# Patient Record
Sex: MALE | Race: WHITE | Employment: UNEMPLOYED | URBAN - METROPOLITAN AREA
[De-identification: names, ages, dates, MRNs, and addresses within clinical notes are randomized per-mention and may not be internally consistent; named-entity substitution may affect disease eponyms.]

---

## 2017-03-13 RX ORDER — SERTRALINE HYDROCHLORIDE 25 MG/1
25 TABLET, FILM COATED ORAL DAILY
Qty: 30 TABLET | Refills: 2 | Status: ON HOLD | OUTPATIENT
Start: 2017-03-13 | End: 2022-03-15 | Stop reason: HOSPADM

## 2017-04-13 ENCOUNTER — OFFICE VISIT (OUTPATIENT)
Dept: FAMILY MEDICINE CLINIC | Age: 22
End: 2017-04-13
Payer: COMMERCIAL

## 2017-04-13 VITALS
BODY MASS INDEX: 24.28 KG/M2 | HEART RATE: 69 BPM | WEIGHT: 179 LBS | OXYGEN SATURATION: 99 % | DIASTOLIC BLOOD PRESSURE: 70 MMHG | SYSTOLIC BLOOD PRESSURE: 120 MMHG

## 2017-04-13 DIAGNOSIS — R41.840 INATTENTION: Primary | ICD-10-CM

## 2017-04-13 DIAGNOSIS — F19.11 HX OF SUBSTANCE ABUSE (HCC): Chronic | ICD-10-CM

## 2017-04-13 PROCEDURE — 99213 OFFICE O/P EST LOW 20 MIN: CPT | Performed by: FAMILY MEDICINE

## 2017-05-21 ENCOUNTER — HOSPITAL ENCOUNTER (EMERGENCY)
Age: 22
Discharge: HOME | End: 2017-05-21
Attending: EMERGENCY MEDICINE
Payer: COMMERCIAL

## 2017-05-21 VITALS
TEMPERATURE: 97.5 F | RESPIRATION RATE: 16 BRPM | WEIGHT: 173 LBS | HEIGHT: 72 IN | HEART RATE: 74 BPM | BODY MASS INDEX: 23.43 KG/M2 | SYSTOLIC BLOOD PRESSURE: 132 MMHG | DIASTOLIC BLOOD PRESSURE: 70 MMHG | OXYGEN SATURATION: 100 %

## 2017-05-21 DIAGNOSIS — J02.9 VIRAL PHARYNGITIS: Primary | ICD-10-CM

## 2017-05-21 LAB
DIRECT EXAM: NORMAL
Lab: NORMAL
Lab: NORMAL
SPECIMEN DESCRIPTION: NORMAL
STATUS: NORMAL
STATUS: NORMAL

## 2017-05-21 PROCEDURE — 99283 EMERGENCY DEPT VISIT LOW MDM: CPT

## 2017-05-21 PROCEDURE — 87651 STREP A DNA AMP PROBE: CPT

## 2017-05-21 RX ORDER — IBUPROFEN 800 MG/1
800 TABLET ORAL EVERY 8 HOURS PRN
Qty: 20 TABLET | Refills: 0 | Status: SHIPPED | OUTPATIENT
Start: 2017-05-21

## 2017-05-21 RX ORDER — ACETAMINOPHEN AND CODEINE PHOSPHATE 300; 30 MG/1; MG/1
1 TABLET ORAL EVERY 6 HOURS PRN
Qty: 20 TABLET | Refills: 0 | Status: SHIPPED | OUTPATIENT
Start: 2017-05-21 | End: 2017-05-21

## 2017-05-21 ASSESSMENT — PAIN DESCRIPTION - DESCRIPTORS: DESCRIPTORS: ACHING;SHARP

## 2017-05-21 ASSESSMENT — ENCOUNTER SYMPTOMS
EYE REDNESS: 0
SHORTNESS OF BREATH: 0
COUGH: 0
SORE THROAT: 1
ABDOMINAL PAIN: 0
EYE DISCHARGE: 0
CONSTIPATION: 0
VOMITING: 0
DIARRHEA: 0
COLOR CHANGE: 0
FACIAL SWELLING: 0

## 2017-05-21 ASSESSMENT — PAIN DESCRIPTION - LOCATION: LOCATION: THROAT

## 2017-05-21 ASSESSMENT — PAIN DESCRIPTION - PAIN TYPE: TYPE: ACUTE PAIN

## 2017-05-21 ASSESSMENT — PAIN DESCRIPTION - FREQUENCY: FREQUENCY: CONTINUOUS

## 2017-05-21 ASSESSMENT — PAIN DESCRIPTION - PROGRESSION: CLINICAL_PROGRESSION: NOT CHANGED

## 2017-05-21 ASSESSMENT — PAIN SCALES - GENERAL: PAINLEVEL_OUTOF10: 9

## 2022-03-09 ENCOUNTER — HOSPITAL ENCOUNTER (INPATIENT)
Age: 27
LOS: 2 days | Discharge: PSYCHIATRIC OR SUBSTANCE USE FACILITY | DRG: 897 | End: 2022-03-12
Attending: STUDENT IN AN ORGANIZED HEALTH CARE EDUCATION/TRAINING PROGRAM | Admitting: INTERNAL MEDICINE

## 2022-03-09 DIAGNOSIS — F10.939 ALCOHOL WITHDRAWAL SYNDROME WITH COMPLICATION (HCC): Primary | ICD-10-CM

## 2022-03-09 LAB
ABSOLUTE EOS #: 0 K/UL (ref 0–0.4)
ABSOLUTE LYMPH #: 2.1 K/UL (ref 1–4.8)
ABSOLUTE MONO #: 0.4 K/UL (ref 0.1–1.3)
ALBUMIN SERPL-MCNC: 4.6 G/DL (ref 3.5–5.2)
ALP BLD-CCNC: 87 U/L (ref 40–129)
ALT SERPL-CCNC: 133 U/L (ref 5–41)
ANION GAP SERPL CALCULATED.3IONS-SCNC: 19 MMOL/L (ref 9–17)
AST SERPL-CCNC: 150 U/L
BASOPHILS # BLD: 1 % (ref 0–2)
BASOPHILS ABSOLUTE: 0 K/UL (ref 0–0.2)
BILIRUB SERPL-MCNC: 0.54 MG/DL (ref 0.3–1.2)
BUN BLDV-MCNC: 6 MG/DL (ref 6–20)
CALCIUM SERPL-MCNC: 8.9 MG/DL (ref 8.6–10.4)
CHLORIDE BLD-SCNC: 98 MMOL/L (ref 98–107)
CO2: 22 MMOL/L (ref 20–31)
CREAT SERPL-MCNC: 0.77 MG/DL (ref 0.7–1.2)
EOSINOPHILS RELATIVE PERCENT: 1 % (ref 0–4)
ETHANOL PERCENT: 0.3 %
ETHANOL: 302 MG/DL
GFR AFRICAN AMERICAN: >60 ML/MIN
GFR NON-AFRICAN AMERICAN: >60 ML/MIN
GFR SERPL CREATININE-BSD FRML MDRD: ABNORMAL ML/MIN/{1.73_M2}
GLUCOSE BLD-MCNC: 106 MG/DL (ref 70–99)
HCT VFR BLD CALC: 51.3 % (ref 41–53)
HEMOGLOBIN: 17.8 G/DL (ref 13.5–17.5)
LIPASE: 45 U/L (ref 13–60)
LYMPHOCYTES # BLD: 39 % (ref 24–44)
MCH RBC QN AUTO: 31.3 PG (ref 26–34)
MCHC RBC AUTO-ENTMCNC: 34.8 G/DL (ref 31–37)
MCV RBC AUTO: 90 FL (ref 80–100)
MONOCYTES # BLD: 7 % (ref 1–7)
PDW BLD-RTO: 14.4 % (ref 11.5–14.9)
PLATELET # BLD: 209 K/UL (ref 150–450)
PMV BLD AUTO: 7.6 FL (ref 6–12)
POTASSIUM SERPL-SCNC: 3.6 MMOL/L (ref 3.7–5.3)
RBC # BLD: 5.7 M/UL (ref 4.5–5.9)
SEG NEUTROPHILS: 52 % (ref 36–66)
SEGMENTED NEUTROPHILS ABSOLUTE COUNT: 2.8 K/UL (ref 1.3–9.1)
SODIUM BLD-SCNC: 139 MMOL/L (ref 135–144)
TOTAL PROTEIN: 7.8 G/DL (ref 6.4–8.3)
WBC # BLD: 5.3 K/UL (ref 3.5–11)

## 2022-03-09 PROCEDURE — 99284 EMERGENCY DEPT VISIT MOD MDM: CPT

## 2022-03-09 PROCEDURE — 36415 COLL VENOUS BLD VENIPUNCTURE: CPT

## 2022-03-09 PROCEDURE — 96376 TX/PRO/DX INJ SAME DRUG ADON: CPT

## 2022-03-09 PROCEDURE — 80053 COMPREHEN METABOLIC PANEL: CPT

## 2022-03-09 PROCEDURE — 6360000002 HC RX W HCPCS: Performed by: STUDENT IN AN ORGANIZED HEALTH CARE EDUCATION/TRAINING PROGRAM

## 2022-03-09 PROCEDURE — 83735 ASSAY OF MAGNESIUM: CPT

## 2022-03-09 PROCEDURE — 96374 THER/PROPH/DIAG INJ IV PUSH: CPT

## 2022-03-09 PROCEDURE — G0480 DRUG TEST DEF 1-7 CLASSES: HCPCS

## 2022-03-09 PROCEDURE — 85025 COMPLETE CBC W/AUTO DIFF WBC: CPT

## 2022-03-09 PROCEDURE — 83690 ASSAY OF LIPASE: CPT

## 2022-03-09 PROCEDURE — 2580000003 HC RX 258: Performed by: STUDENT IN AN ORGANIZED HEALTH CARE EDUCATION/TRAINING PROGRAM

## 2022-03-09 RX ORDER — LORAZEPAM 2 MG/ML
0.5 INJECTION INTRAMUSCULAR ONCE
Status: COMPLETED | OUTPATIENT
Start: 2022-03-09 | End: 2022-03-09

## 2022-03-09 RX ORDER — 0.9 % SODIUM CHLORIDE 0.9 %
1000 INTRAVENOUS SOLUTION INTRAVENOUS ONCE
Status: COMPLETED | OUTPATIENT
Start: 2022-03-10 | End: 2022-03-10

## 2022-03-09 RX ORDER — 0.9 % SODIUM CHLORIDE 0.9 %
1000 INTRAVENOUS SOLUTION INTRAVENOUS ONCE
Status: COMPLETED | OUTPATIENT
Start: 2022-03-09 | End: 2022-03-09

## 2022-03-09 RX ADMIN — LORAZEPAM 0.5 MG: 2 INJECTION INTRAMUSCULAR; INTRAVENOUS at 21:24

## 2022-03-09 RX ADMIN — SODIUM CHLORIDE 1000 ML: 9 INJECTION, SOLUTION INTRAVENOUS at 21:24

## 2022-03-09 ASSESSMENT — ENCOUNTER SYMPTOMS
RHINORRHEA: 0
VOMITING: 0
ABDOMINAL PAIN: 0
DIARRHEA: 0
BACK PAIN: 0
EYE REDNESS: 0
NAUSEA: 0
SHORTNESS OF BREATH: 0
EYE PAIN: 0
SORE THROAT: 0
COUGH: 0
FACIAL SWELLING: 0
COLOR CHANGE: 0

## 2022-03-10 PROBLEM — F10.139 ALCOHOL ABUSE WITH WITHDRAWAL (HCC): Status: ACTIVE | Noted: 2022-03-10

## 2022-03-10 LAB — MAGNESIUM: 2 MG/DL (ref 1.6–2.6)

## 2022-03-10 PROCEDURE — 6360000002 HC RX W HCPCS: Performed by: NURSE PRACTITIONER

## 2022-03-10 PROCEDURE — 2500000003 HC RX 250 WO HCPCS: Performed by: NURSE PRACTITIONER

## 2022-03-10 PROCEDURE — 99223 1ST HOSP IP/OBS HIGH 75: CPT | Performed by: INTERNAL MEDICINE

## 2022-03-10 PROCEDURE — 2060000000 HC ICU INTERMEDIATE R&B

## 2022-03-10 PROCEDURE — 6360000002 HC RX W HCPCS: Performed by: STUDENT IN AN ORGANIZED HEALTH CARE EDUCATION/TRAINING PROGRAM

## 2022-03-10 PROCEDURE — 2580000003 HC RX 258: Performed by: STUDENT IN AN ORGANIZED HEALTH CARE EDUCATION/TRAINING PROGRAM

## 2022-03-10 PROCEDURE — 90792 PSYCH DIAG EVAL W/MED SRVCS: CPT | Performed by: PSYCHIATRY & NEUROLOGY

## 2022-03-10 PROCEDURE — 6370000000 HC RX 637 (ALT 250 FOR IP): Performed by: NURSE PRACTITIONER

## 2022-03-10 PROCEDURE — 2580000003 HC RX 258: Performed by: NURSE PRACTITIONER

## 2022-03-10 PROCEDURE — 6370000000 HC RX 637 (ALT 250 FOR IP): Performed by: INTERNAL MEDICINE

## 2022-03-10 RX ORDER — SODIUM CHLORIDE 0.9 % (FLUSH) 0.9 %
5-40 SYRINGE (ML) INJECTION EVERY 12 HOURS SCHEDULED
Status: DISCONTINUED | OUTPATIENT
Start: 2022-03-10 | End: 2022-03-12 | Stop reason: HOSPADM

## 2022-03-10 RX ORDER — ONDANSETRON 2 MG/ML
4 INJECTION INTRAMUSCULAR; INTRAVENOUS EVERY 6 HOURS PRN
Status: DISCONTINUED | OUTPATIENT
Start: 2022-03-10 | End: 2022-03-12 | Stop reason: HOSPADM

## 2022-03-10 RX ORDER — ACETAMINOPHEN 325 MG/1
650 TABLET ORAL EVERY 6 HOURS PRN
Status: DISCONTINUED | OUTPATIENT
Start: 2022-03-10 | End: 2022-03-12 | Stop reason: HOSPADM

## 2022-03-10 RX ORDER — LORAZEPAM 2 MG/ML
0.5 INJECTION INTRAMUSCULAR ONCE
Status: COMPLETED | OUTPATIENT
Start: 2022-03-10 | End: 2022-03-10

## 2022-03-10 RX ORDER — OMEPRAZOLE 20 MG/1
40 CAPSULE, DELAYED RELEASE ORAL DAILY
COMMUNITY

## 2022-03-10 RX ORDER — ONDANSETRON 4 MG/1
4 TABLET, ORALLY DISINTEGRATING ORAL EVERY 8 HOURS PRN
Status: DISCONTINUED | OUTPATIENT
Start: 2022-03-10 | End: 2022-03-12 | Stop reason: HOSPADM

## 2022-03-10 RX ORDER — LORAZEPAM 1 MG/1
3 TABLET ORAL
Status: DISCONTINUED | OUTPATIENT
Start: 2022-03-10 | End: 2022-03-10

## 2022-03-10 RX ORDER — CHLORDIAZEPOXIDE HYDROCHLORIDE 25 MG/1
25 CAPSULE, GELATIN COATED ORAL 3 TIMES DAILY
Status: DISCONTINUED | OUTPATIENT
Start: 2022-03-10 | End: 2022-03-12

## 2022-03-10 RX ORDER — LORAZEPAM 1 MG/1
4 TABLET ORAL
Status: DISCONTINUED | OUTPATIENT
Start: 2022-03-10 | End: 2022-03-10

## 2022-03-10 RX ORDER — POTASSIUM CHLORIDE 7.45 MG/ML
10 INJECTION INTRAVENOUS PRN
Status: DISCONTINUED | OUTPATIENT
Start: 2022-03-10 | End: 2022-03-12 | Stop reason: HOSPADM

## 2022-03-10 RX ORDER — LORAZEPAM 2 MG/ML
1 INJECTION INTRAMUSCULAR
Status: DISCONTINUED | OUTPATIENT
Start: 2022-03-10 | End: 2022-03-10

## 2022-03-10 RX ORDER — LORAZEPAM 2 MG/ML
1 INJECTION INTRAMUSCULAR ONCE
Status: COMPLETED | OUTPATIENT
Start: 2022-03-10 | End: 2022-03-10

## 2022-03-10 RX ORDER — POLYETHYLENE GLYCOL 3350 17 G/17G
17 POWDER, FOR SOLUTION ORAL DAILY PRN
Status: DISCONTINUED | OUTPATIENT
Start: 2022-03-10 | End: 2022-03-12 | Stop reason: HOSPADM

## 2022-03-10 RX ORDER — PANTOPRAZOLE SODIUM 40 MG/1
40 TABLET, DELAYED RELEASE ORAL
Status: DISCONTINUED | OUTPATIENT
Start: 2022-03-10 | End: 2022-03-12 | Stop reason: HOSPADM

## 2022-03-10 RX ORDER — LORAZEPAM 2 MG/ML
2 INJECTION INTRAMUSCULAR
Status: DISCONTINUED | OUTPATIENT
Start: 2022-03-10 | End: 2022-03-10

## 2022-03-10 RX ORDER — POTASSIUM CHLORIDE 20 MEQ/1
40 TABLET, EXTENDED RELEASE ORAL PRN
Status: DISCONTINUED | OUTPATIENT
Start: 2022-03-10 | End: 2022-03-12 | Stop reason: HOSPADM

## 2022-03-10 RX ORDER — M-VIT,TX,IRON,MINS/CALC/FOLIC 27MG-0.4MG
1 TABLET ORAL DAILY
Status: DISCONTINUED | OUTPATIENT
Start: 2022-03-10 | End: 2022-03-12 | Stop reason: HOSPADM

## 2022-03-10 RX ORDER — LORAZEPAM 2 MG/ML
4 INJECTION INTRAMUSCULAR
Status: DISCONTINUED | OUTPATIENT
Start: 2022-03-10 | End: 2022-03-11

## 2022-03-10 RX ORDER — LORAZEPAM 2 MG/ML
3 INJECTION INTRAMUSCULAR
Status: DISCONTINUED | OUTPATIENT
Start: 2022-03-10 | End: 2022-03-10

## 2022-03-10 RX ORDER — LORAZEPAM 1 MG/1
1 TABLET ORAL
Status: DISCONTINUED | OUTPATIENT
Start: 2022-03-10 | End: 2022-03-10

## 2022-03-10 RX ORDER — SODIUM CHLORIDE 9 MG/ML
INJECTION, SOLUTION INTRAVENOUS CONTINUOUS
Status: DISCONTINUED | OUTPATIENT
Start: 2022-03-10 | End: 2022-03-12 | Stop reason: HOSPADM

## 2022-03-10 RX ORDER — SODIUM CHLORIDE 9 MG/ML
25 INJECTION, SOLUTION INTRAVENOUS PRN
Status: DISCONTINUED | OUTPATIENT
Start: 2022-03-10 | End: 2022-03-12 | Stop reason: HOSPADM

## 2022-03-10 RX ORDER — LORAZEPAM 1 MG/1
2 TABLET ORAL
Status: DISCONTINUED | OUTPATIENT
Start: 2022-03-10 | End: 2022-03-10

## 2022-03-10 RX ORDER — ACETAMINOPHEN 650 MG/1
650 SUPPOSITORY RECTAL EVERY 6 HOURS PRN
Status: DISCONTINUED | OUTPATIENT
Start: 2022-03-10 | End: 2022-03-12 | Stop reason: HOSPADM

## 2022-03-10 RX ORDER — SODIUM CHLORIDE 0.9 % (FLUSH) 0.9 %
10 SYRINGE (ML) INJECTION PRN
Status: DISCONTINUED | OUTPATIENT
Start: 2022-03-10 | End: 2022-03-12 | Stop reason: HOSPADM

## 2022-03-10 RX ADMIN — LORAZEPAM 1 MG: 1 TABLET ORAL at 09:51

## 2022-03-10 RX ADMIN — SODIUM CHLORIDE: 9 INJECTION, SOLUTION INTRAVENOUS at 07:56

## 2022-03-10 RX ADMIN — SODIUM CHLORIDE 1000 ML: 9 INJECTION, SOLUTION INTRAVENOUS at 00:09

## 2022-03-10 RX ADMIN — Medication 10 ML: at 09:00

## 2022-03-10 RX ADMIN — PANTOPRAZOLE SODIUM 40 MG: 40 TABLET, DELAYED RELEASE ORAL at 20:39

## 2022-03-10 RX ADMIN — CHLORDIAZEPOXIDE HYDROCHLORIDE 25 MG: 25 CAPSULE ORAL at 20:39

## 2022-03-10 RX ADMIN — CHLORDIAZEPOXIDE HYDROCHLORIDE 25 MG: 25 CAPSULE ORAL at 12:28

## 2022-03-10 RX ADMIN — LORAZEPAM 1 MG: 2 INJECTION INTRAMUSCULAR; INTRAVENOUS at 04:36

## 2022-03-10 RX ADMIN — LORAZEPAM 0.5 MG: 2 INJECTION INTRAMUSCULAR; INTRAVENOUS at 01:01

## 2022-03-10 RX ADMIN — LORAZEPAM 4 MG: 2 INJECTION INTRAMUSCULAR; INTRAVENOUS at 23:55

## 2022-03-10 RX ADMIN — SODIUM CHLORIDE: 9 INJECTION, SOLUTION INTRAVENOUS at 23:57

## 2022-03-10 RX ADMIN — SODIUM CHLORIDE: 9 INJECTION, SOLUTION INTRAVENOUS at 16:20

## 2022-03-10 RX ADMIN — ENOXAPARIN SODIUM 40 MG: 40 INJECTION SUBCUTANEOUS at 09:52

## 2022-03-10 RX ADMIN — MULTIPLE VITAMINS W/ MINERALS TAB 1 TABLET: TAB at 09:51

## 2022-03-10 RX ADMIN — FOLIC ACID: 5 INJECTION, SOLUTION INTRAMUSCULAR; INTRAVENOUS; SUBCUTANEOUS at 12:21

## 2022-03-10 NOTE — ED NOTES
Report given to Charmayne Burrows, RN from [facility]. Report method by phone   The following was reviewed with receiving RN:   Current vital signs:  /70   Pulse 90   Temp 97.7 °F (36.5 °C)   Resp 16   Ht 6' (1.829 m)   SpO2 96%   BMI 23.46 kg/m²                MEWS Score: 2     Any medication or safety alerts were reviewed. Any pending diagnostics and notifications were also reviewed, as well as any safety concerns or issues, abnormal labs, abnormal imaging, and abnormal assessment findings. Questions were answered.             Yimi Dey RN  03/10/22 0563

## 2022-03-10 NOTE — FLOWSHEET NOTE
03/10/22 0940   Encounter Summary   [facility] Completed   Routine   Intervention Contacted support as requested per patient/family request   Who? Omar Brown   Why?  Spiritual Support   At Request Of Patient

## 2022-03-10 NOTE — PROGRESS NOTES
Patient resting with eyes closed arouses easy sitter at bedside , earlier patient complained of weird dreams sitter noted jerking while sleep will continue to monitor no acute signs of withdrawal at this time.

## 2022-03-10 NOTE — CARE COORDINATION
CASE MANAGEMENT NOTE:    Admission Date:  3/9/2022 Preeti Delacruz is a 32 y.o.  male    Admitted for : Alcohol withdrawal syndrome with complication ([facility] [T19.365]  [facility] withdrawal ([facility] [F10.139]    Met with:  Patient    PCP:  None, provided information for [facility]:  none      Is patient alert and oriented at time of discussion:  Yes    Current Residence/ Living Arrangements:  independently at home             Current Services PTA:  No    Does patient go to outpatient dialysis: No  If yes, location and chair time:     Is patient agreeable to VNS: No    Freedom of choice provided:  NA    List of [address] provided: NA    VNS chosen:  NA    DME:  none    Home Oxygen: No    Nebulizer: No    CPAP/BIPAP: No    Supplier: N/A    Potential Assistance Needed: No    SNF needed: No    Freedom of choice and list provided: NA    Ph[facility]acy:  [facility]       Does Patient want to use MEDS to BEDS? No    Is patient currently receiving oral anticoagulation therapy? No    Is the Patient an LETICIA GEE [facility] with Readmission Risk Score greater than 14%? No  If yes, pt needs a follow up appointment made within 7 days. Family Members/Caregivers that pt would like involved in their care:    No    If yes, list name here:      Transportation Provider:  Patient             Discharge Plan:  Patient from home with dad. Independent and drives. Provided information for [facility]. Denies need for VNS. DME:none. Requesting information on Drug and alcohol rehab.                   Electronically signed by: Jimmie Banks RN on 3/10/2022 at 10:20 AM

## 2022-03-10 NOTE — CARE COORDINATION
Department of Psychiatry  Attending Physician Psychiatric Consult    Care Coordination note only - not to be used for billing    Patient was seen and examined in person, chart reviewed, case discussed with staff/team     Reason for consult:   Concerns for suicidal ideation     HISTORY OF PRESENT ILLNESS:   Patient is a 60-year-old male with a history of depression and anxiety. Patient also endorses a significant history of alcohol use disorder. Patient was seen at bedside for evaluation with a sitter present. Patient presented to be uncomfortable and in acute distress related to reported alcohol withdrawal symptoms. Patient states that he experienced auditory hallucinations of his cell phone ringing when it was not earlier today. He relates this to withdrawal symptoms. Denies history of any additional auditory hallucinations. Patient states he has not started severe withdrawal symptoms yet, and is worried about returning to the community and getting sick. Patient was visibly lethargic throughout interview with eyes shut most of the time. Prior to admission patient reports drinking 15-20 shots of liquor per day for last few days related to recent break-up. Patient reports having suicidal thoughts a few days ago, for 1 day. Patient states that he had no plan or intention to harm himself and the thoughts were passive and went away. Patient denies drinking with intention to harm himself. Patient states alcohol consumption was related to desire to numb his pain and denies any thoughts or desire to harm himself or end his life. Patient does endorse significant depression at this time as an 8 out of 10 on a 1-10 scale (1 being low and 10 being high). Patient reports she has been dealing with depression since high school and relates current depression to job loss and loss of apartment.   Patient states recently his sleep has been affected, has had a reduction of interest, reduction in energy and reduction in concentration. Patient reports his appetite is \"on and off\". Patient reports struggling with feelings of guilt and worthlessness. Patient states he had suicidal ideations 1 time in high school with plan to overdose however never actually attempted to harm himself. Patient reported multiple times if discharged, he would be able to maintain safety. Patient reports his main concern is sobriety and states he would benefit from further hospitalization to prevent him from drinking. Patient was encouraged to follow-up with outpatient therapy and alcohol treatment and patient reports desire to do this. When discussing support in the community patient endorses having significant support from friends and family. Patient reports coping skills of cooking, reading and art if desire to drink returns. Patient also endorses someone from Medicaid talked with him about setting up outpatient care for mental health treatment. He reports desire to follow through with therapy and medication management of depression. Patient may benefit from treatment with antidepressant. Patient reports that he would not benefit from inpatient hospitalization related to safety. It is this author's assessment that patient will be able to effectively address mental health symptoms outpatient and is not a safety risk to himself at time of interview. When discussing alcohol consumption patient reports that he has been drinking significantly since 21years old. Patient endorses a period of sobriety for 3 months about a year ago. Patient reports drinking 15-20 shots per day for the last few days. Patient states the last time he drank was 5:30 PM prior to admission. Blood alcohol level was 0.302 upon admitting labs. When discussing illicit drug use patient reports marijuana consumption 5 days of the week. Patient endorses a history of experimenting with cocaine.     PSYCHIATRIC HISTORY: Endorses  Currently follows with no outpatient provider. Reports previously following [facility]. Patient states issues with insurance provided further appointments. Lifetime suicide attempts: No previous attempts. 1 previous occurrence of suicidal ideation in high school. Previous psychiatric hospital admissions: Reports 2 inpatient psychiatric hospitalizations at [facility]. Past psychiatric medications includes: Seroquel and Wellbutrin    Adverse reactions from psychotropic medications: Denies    Lifetime Psychiatric Review of Systems       Depression: Endorses     Nirali or Hypomania: Denies     Panic Attacks: Endorses     Phobias: Denies     Obsessions and Compulsions: Denies      Body or Vocal Tics: Denies     Hallucinations: Denies     Delusions: Denies    Past Medical History:        Diagnosis Date    Moderate episode of recurrent major depressive disorder ([facility] 12/8/2016       Past Surgical History:        Procedure Laterality Date    TYMPANOSTOMY TUBE PLACEMENT Bilateral 1996    WRIST FRACTURE SURGERY Right 2011       Allergies:  Grass pollen(k-o-r-t-swt ashutosh)    Social History:     Patient was born twin [address] [place] [address]. Raised by his parents who split up when he was 10years old. Patient reports being raised at the time by his mother and half-time by his father. Patient reports currently being close to both. Patient states he has 1 sister, 1 stepsister and one half brother. Patient reports not graduating from high school. Patient currently works at [employer]. Patient denies ever being  and reports having no kids. Patient states he is living with his dad for the time being after recently losing his apartment. Patient reports assets as significant support in the community through friends and family.       DRUG USE HISTORY  Social History     Tobacco Use   Smoking Status Current Every Day Smoker    Packs/day: 0.50    Types: E-Cigarettes   Smokeless Tobacco Never Used     Social History     Substance and Sexual Activity   Alcohol Use Yes    Alcohol/week: 15.0 standard drinks    Types: 15 Shots of liquor per week    Comment: 15 shots of whiskey/vodka a day     Social History     Substance and Sexual Activity   Drug Use Yes    Types: Marijuana (Weed)    Comment: daily     When discussing alcohol consumption patient reports that he has been drinking significantly since 21years old. Patient endorses a period of sobriety for 3 months about a year ago. Patient reports drinking 15-20 shots per day for the last few days. Patient states the last time he drank was 5:30 PM prior to admission. Blood alcohol level was 0.302 upon admitting labs. When discussing illicit drug use patient reports marijuana consumption 5 days of the week. Patient endorses a history of experimenting with cocaine. LEGAL HISTORY:   HISTORY OF INCARCERATION: Denies     Family History:       Problem Relation Age of Onset    Depression Mother     Depression Sister     Depression Maternal Aunt     Heart Attack Neg Hx     Stroke Neg Hx     Cancer Neg Hx     Diabetes Neg Hx        Psychiatric Family History  Dx: Reports sister has ADHD and depression. Reports denies anxiety. Reports mother has something but is unsure about diagnosis. Suicides in family: Reports his aunt committed suicide by hanging. Substance use in family: Endorses numerous family members are alcoholics. PHYSICAL EXAM:  Vitals:  BP (!) 150/103   Pulse 95   Temp 98.4 °F (36.9 °C) (Axillary)   Resp 20   Ht 6' (1.829 m)   Wt 229 lb 4.5 oz (104 kg)   SpO2 96%   BMI 31.10 kg/m²     Review of Systems   Constitutional: Negative for chills and weight loss. HENT: Negative for ear pain and nosebleeds. Eyes: Negative for blurred vision and photophobia. Respiratory: Negative for cough, shortness of breath and wheezing. Cardiovascular: Negative for chest pain and palpitations. Gastrointestinal: Negative for abdominal pain, diarrhea and vomiting. Genitourinary: Negative for dysuria and urgency. Musculoskeletal: Negative for falls and joint pain. Skin: Negative for itching and rash. Neurological: Negative for tremors, seizures and weakness. Endo/Heme/Allergies: Does not bruise/bleed easily. Physical Exam:    Constitutional:  Appears well-developed and well-nourished, no acute distress  HENT: No sinus or nasal congestion, no ear pain or hearing loss   Head: Normocephalic and atraumatic. Eyes: PERRLA, Conjunctivae are normal. Right eye exhibits no discharge. Left eye exhibits no discharge. No scleral icterus. Neck: Normal range of motion. Neck supple. Pulmonary/Chest:  No respiratory distress or accessory muscle use, no wheezing. Abdominal: Soft. Exhibits no distension. Musculoskeletal: Normal range of motion. Exhibits no edema. Neurological: cranial nerves II-XII grossly in tact, normal gait and station  Skin: Skin is warm and dry. Patient is not diaphoretic. No erythema. Mental Status Examination:    Level of consciousness:   Lethargic  Appearance:  Casually dressed, good grooming and hygiene  Behavior/Motor:  Tired, closing eyes for most of interview  Attitude toward examiner:  Cooperative  Speech: normal rate and volume  Mood:  depressed  Affect:  Congruent  Thought processes:  Goal-Directed, linear, coherent  Thought content:  Suicidal ideations: Denies         Homicidal ideations: Denies             Hallucinations: Endorses auditory hallucinations of cell phone ringing   Delusions: Denies  Cognition:  Oriented to self, location, time, situation  Concentration within normal limits  Memory: recent and remote memory intact  Insight &Judgment: Fair     ASSESSMENT:   Major depressive disorder, recurrent, severe, without suicidal ideation. PLAN:    Can discharge home after he is medically cleared. Consulted attending psychiatrist: Will consider starting antidepressant while in hospital.        Thank you very much for allowing us to participate in the care of this patient.

## 2022-03-10 NOTE — H&P
History and Physical Service  [facility] Internal Medicine    HISTORY AND PHYSICAL EXAMINATION            Date:   3/10/2022  Patient name:  Matthew Figueroa  MRN:   [id number]  Account:  [de-identified]  YOB: 1995  PCP:    No primary care provider on file. Code Status:    Full Code    Chief Complaint:     Chief Complaint   Patient presents with    Withdrawal     alcohol    Depression         History Obtained From:     Patient, EMR, nursing staff  His  HPI   tory Obtained From:  Past MediPast Medical History:edison History: This patient is a 32 y.o. Non- / non  malewho presents with  59-year-old  male past medical history of substance abuse and recurrent major depressive disorder who presents to the ED for alcohol withdrawal and depression. According to patient, he has been drinking 15-20 shots of liquor every day for the past several days  History of heavy drinking for last several years  Reports auditory hallucinations previously when undergoing withdrawal  Requesting help with withdrawal  Patient was anxious tremulous restless at presentation  Voiced suicidal ideation on admission-psychiatry consulted      Review of Systems:     Denies any shortness of breath or cough  Denies chest pain or palpitations  Denies abdominal pain, diarrhea vomiting  Denies any new numbness tremors or weakness. A 10 point review of systems was performed and and negative except as mentioned in HPI  Positive and Negative as described in HPI. Past Medical History:     Past Medical History:   Diagnosis Date    Moderate episode of recurrent major depressive disorder ([facility] 12/8/2016        Past Surgical History:     Past Surgical History:   Procedure Laterality Date    TYMPANOSTOMY TUBE PLACEMENT Bilateral 1996    WRIST FRACTURE SURGERY Right 2011        Medications Prior to Admission:     Prior to Admission medications    Medication Sig Start Date End Date Taking?  Authorizing Provider omeprazole (PRILOSEC) 20 MG delayed release capsule Take 40 mg by mouth Daily   Yes Historical Provider, MD   ibuprofen (ADVIL;MOTRIN) 800 MG tablet Take 1 tablet by mouth every 8 hours as needed for Pain 5/21/17   Yun Huber MD   sertraline (ZOLOFT) 25 MG tablet Take 1 tablet by mouth daily 3/13/17   Flynn Campos MD        Allergies:     Grass pollen(k-o-r-t-swt ashutosh)    Social History:     Tobacco:    reports that he has been smoking e-cigarettes. He has been smoking about 0.50 packs per day. He has never used smokeless tobacco.  Alcohol:      reports current alcohol use of about 15.0 standard drinks of alcohol per week. Drug Use:  reports current drug use. Drug: Marijuana Leonides Brim). Family History:     Family History   Problem Relation Age of Onset    Depression Mother     Depression Sister     Depression Maternal Aunt     Heart Attack Neg Hx     Stroke Neg Hx     Cancer Neg Hx     Diabetes Neg Hx            Physical Exam:   BP (!) 140/94   Pulse 98   Temp 98 °F (36.7 °C) (Oral)   Resp 20   Ht 6' (1.829 m)   Wt 229 lb 4.5 oz (104 kg)   SpO2 96%   BMI 31.10 kg/m²   No results for input(s): POCGLU in the last 72 hours. General Appearance:  alert, well appearing, and in no acute distress  Mental status: oriented to person, place, and time with normal affect  Head:  normocephalic, atraumatic. Eye: no icterus, redness, pupils equal and reactive, extraocular eye movements intact, conjunctiva clear  Ear: normal external ear, no discharge, hearing intact  Nose:  no drainage noted  Mouth: mucous membranes moist  Neck: supple, no carotid bruits, thyroid not palpable  Lungs: Bilateral equal air entry, clear to ausculation, no wheezing, rales or rhonchi, normal effort  Cardiovascular: normal rate, regular rhythm, no murmur, gallop, rub.   Abdomen: Soft, nontender, nondistended, normal bowel sounds, no hepatomegaly or splenomegaly  Neurologic: There are no new focal motor or sensory deficits, normal muscle tone and bulk, no abnormal sensation, normal speech, cranial nerves II through XII grossly intact  Skin: No gross lesions, rashes, bruising or bleeding on exposed skin area  Extremities:  peripheral pulses palpable, no pedal edema or calf pain with palpation  Psych: normal affect     Investigations:      Laboratory Testing:  Recent Results (from the past 24 hour(s))   CBC with Auto Differential    Collection Time: 03/09/22  9:20 PM   Result Value Ref Range    WBC 5.3 3.5 - 11.0 k/uL    RBC 5.70 4.5 - 5.9 m/uL    Hemoglobin 17.8 (H) 13.5 - 17.5 g/dL    Hematocrit 51.3 41 - 53 %    MCV 90.0 80 - 100 fL    MCH 31.3 26 - 34 pg    MCHC 34.8 31 - 37 g/dL    RDW 14.4 11.5 - 14.9 %    Platelets 466 206 - 011 k/uL    MPV 7.6 6.0 - 12.0 fL    Seg Neutrophils 52 36 - 66 %    Lymphocytes 39 24 - 44 %    Monocytes 7 1 - 7 %    Eosinophils % 1 0 - 4 %    Basophils 1 0 - 2 %    Segs Absolute 2.80 1.3 - 9.1 k/uL    Absolute Lymph # 2.10 1.0 - 4.8 k/uL    Absolute Mono # 0.40 0.1 - 1.3 k/uL    Absolute Eos # 0.00 0.0 - 0.4 k/uL    Basophils Absolute 0.00 0.0 - 0.2 k/uL   Comprehensive Metabolic Panel w/ Reflex to MG    Collection Time: 03/09/22  9:20 PM   Result Value Ref Range    Glucose 106 (H) 70 - 99 mg/dL    BUN 6 6 - 20 mg/dL    CREATININE 0.77 0.70 - 1.20 mg/dL    Calcium 8.9 8.6 - 10.4 mg/dL    Sodium 139 135 - 144 mmol/L    Potassium 3.6 (L) 3.7 - 5.3 mmol/L    Chloride 98 98 - 107 mmol/L    CO2 22 20 - 31 mmol/L    Anion Gap 19 (H) 9 - 17 mmol/L    Alkaline Phosphatase 87 40 - 129 U/L     (H) 5 - 41 U/L     (H) <40 U/L    Total Bilirubin 0.54 0.3 - 1.2 mg/dL    Total Protein 7.8 6.4 - 8.3 g/dL    Albumin 4.6 3.5 - 5.2 g/dL    GFR Non-African American >60 >60 mL/min    GFR African American >60 >60 mL/min    GFR Comment         Lipase    Collection Time: 03/09/22  9:20 PM   Result Value Ref Range    Lipase 45 13 - 60 U/L   Ethanol    Collection Time: 03/09/22  9:20 PM   Result Value Ref Range Ethanol 302 (HH) <10 mg/dL    Ethanol percent 0.302 %       Recent Labs     03/09/22 2120   HGB 17.8*   HCT 51.3   WBC 5.3   MCV 90.0      K 3.6*   CL 98   CO2 22   BUN 6   CREATININE 0.77   GLUCOSE 106*   *   *   LABALBU 4.6       Hematology:  Recent Labs     03/09/22 2120   WBC 5.3   RBC 5.70   HGB 17.8*   HCT 51.3   MCV 90.0   MCH 31.3   MCHC 34.8   RDW 14.4      MPV 7.6     Chemistry:  Recent Labs     03/09/22 2120      K 3.6*   CL 98   CO2 22   GLUCOSE 106*   BUN 6   CREATININE 0.77   ANIONGAP 19*   LABGLOM >60   GFRAA >60   CALCIUM 8.9     Recent Labs     03/09/22 2120   PROT 7.8   LABALBU 4.6   *   *   ALKPHOS 87   BILITOT 0.54   LIPASE 45       Imaging/Diagnostics:       No results found.      Current Facility-Administered Medications   Medication Dose Route Frequency Provider Last Rate Last Admin    0.9 % sodium chloride infusion   IntraVENous Continuous SUSAN Storm  mL/hr at 03/10/22 0756 New Bag at 03/10/22 0756    sodium chloride flush 0.9 % injection 5-40 mL  5-40 mL IntraVENous 2 times per day SUSAN Storm CNP   10 mL at 03/10/22 0900    sodium chloride flush 0.9 % injection 10 mL  10 mL IntraVENous PRN SUSAN Storm CNP        0.9 % sodium chloride infusion  25 mL IntraVENous PRN SUSAN Storm CNP        potassium chloride (KLOR-CON M) extended release tablet 40 mEq  40 mEq Oral PRN SUSAN Storm CNP        Or    potassium bicarb-citric acid (EFFER-K) effervescent tablet 40 mEq  40 mEq Oral PRN SUSAN Storm CNP        Or    potassium chloride 10 mEq/100 mL IVPB (Peripheral Line)  10 mEq IntraVENous PRN SUSAN Storm CNP        enoxaparin (LOVENOX) injection 40 mg  40 mg SubCUTAneous Daily SUSAN Storm CNP   40 mg at 03/10/22 3289    ondansetron (ZOFRAN-ODT) disintegrating tablet 4 mg  4 mg Oral Q8H PRN SUSAN Storm - CNP        Or    ondansetron [facility]) injection 4 mg 4 mg IntraVENous Q6H PRN Modesta Daubs, APRN - CNP        polyethylene glycol [facility]) packet 17 g  17 g Oral Daily PRN Modesta Daubs, APRN - CNP        acetaminophen (TYLENOL) tablet 650 mg  650 mg Oral Q6H PRN Modesta Daubs, APRN - CNP        Or    acetaminophen (TYLENOL) suppository 650 mg  650 mg Rectal Q6H PRN Modesta Daubs, APRN - CNP        therapeutic multivitamin-minerals 1 tablet  1 tablet Oral Daily Modesta Daubs, APRN - CNP   1 tablet at 18/83/57 3187    folic acid 1 mg, thiamine (B-1) 100 mg in dextrose 5 % 50 mL IVPB   IntraVENous Daily Modesta Daubs, APRN - CNP        LORazepam (ATIVAN) tablet 1 mg  1 mg Oral Q1H PRN Modesta Daubs, APRN - CNP   1 mg at 03/10/22 7587    Or    LORazepam (ATIVAN) injection 1 mg  1 mg IntraVENous Q1H PRN Modesta Daubs, APRN - CNP        Or    LORazepam (ATIVAN) tablet 2 mg  2 mg Oral Q1H PRN Modesta Daubs, APRN - CNP        Or    LORazepam (ATIVAN) injection 2 mg  2 mg IntraVENous Q1H PRN Modesta Daubs, APRN - CNP        Or    LORazepam (ATIVAN) tablet 3 mg  3 mg Oral Q1H PRN Modesta Daubs, APRN - CNP        Or    LORazepam (ATIVAN) injection 3 mg  3 mg IntraVENous Q1H PRN Modesta Daubs, APRN - CNP        Or    LORazepam (ATIVAN) tablet 4 mg  4 mg Oral Q1H PRN Modesta Daubs, APRN - CNP        Or    LORazepam (ATIVAN) injection 4 mg  4 mg IntraVENous Q1H PRN Modesta Daubs, APRN - CNP        nicotine polacrilex (NICORETTE) gum 4 mg  4 mg Oral Q1H PRN Modesta Daubs, APRN - CNP        pantoprazole (PROTONIX) tablet 40 mg  40 mg Oral QAM AC Modesta Daubs, APRN - CNP           Impressions :     1. Principal Problem:    Alcohol abuse with withdrawal ([facility]  Resolved Problems:    * No resolved hospital problems. *        2.  has a past medical history of Moderate episode of recurrent major depressive disorder ([facility] (12/8/2016). Plans:     1. Alcohol intoxication, dependence -admitted for management of withdrawal symptoms.   Has received 2.5mg of Ativan in the last 12 hours last dose 2 hours ago. Discontinue CIWA, start Librium  2.  Elevated transaminases secondary to alcohol abuse   3. suicidal thoughts-psych consult    DVT pplx-Lovenox      Yessica Dimas MD  3/10/2022  11:15 AM

## 2022-03-10 NOTE — PROGRESS NOTES
The patient is a 49-year-old  male past medical history of substance abuse and recurrent major depressive disorder who presents to the ED for alcohol withdrawal and depression. According to patient, he has been drinking 15-20 shots of liquor every day for the past several days, since breaking up with his girlfriend a couple weeks ago. Prior to that, patient reports heavy drinking for the past 6 months and states that he has been drinking alcohol since the age of 24. Reports past episodes of alcohol withdrawal without seizures but does admit to intermittent auditory hallucinations in the past, such as thinking he hears the telephone ringing. Additionally, patient reports having suicidal ideation a few days ago without specific plan mentioned. Patient reports that he is \"done drinking\" and wants help with the withdrawal process.  on arrival with last drink at 5 PM this evening. Patient is anxious and tremulous, restless and having difficulty sleeping. Alcohol withdrawal orders initiated, including CIWA scale. Will place on suicide precautions with sitter at bedside until evaluated by psychiatrist later today.   Nicotine gum initiated for nicotine withdrawal.

## 2022-03-10 NOTE — ED PROVIDER NOTES
No swelling. Normal range of motion. Cervical back: Normal range of motion. No rigidity. Skin:     General: Skin is warm and dry. Neurological:      General: No focal deficit present. Mental Status: He is alert. Mental status is at baseline. Cranial Nerves: No cranial nerve deficit. Psychiatric:      Comments: Anxious          MEDICAL DECISION MAKINyear old male presents for evaluation of alcohol withdrawals. Patient is tachycardic and anxious appearing. We will check basic labs, treat symptomatically, reassess. Currently denying suicidal ideations. Patient persistently tachycardic and anxious after several liters of IV fluid, several doses of benzodiazepine. His alcohol level was significantly elevated in the 300s. With ongoing symptoms will admit for impending alcohol withdrawal for close observation. CRITICAL CARE:       PROCEDURES:    Procedures    DIAGNOSTIC RESULTS   EKG:All EKG's are interpreted by the Emergency Department Physician who either signs or Co-signs this chart in the absence of a cardiologist.        RADIOLOGY:All plain film, CT, MRI, and formal ultrasound images (except ED bedside ultrasound) are read by the radiologist, see reports below, unless otherwisenoted in MDM or here. No orders to display     LABS: All lab results were reviewed by myself, and all abnormals are listed below.   Labs Reviewed   CBC WITH AUTO DIFFERENTIAL - Abnormal; Notable for the following components:       Result Value    Hemoglobin 17.8 (*)     All other components within normal limits   COMPREHENSIVE METABOLIC PANEL W/ REFLEX TO MG FOR LOW K - Abnormal; Notable for the following components:    Glucose 106 (*)     Potassium 3.6 (*)     Anion Gap 19 (*)      (*)      (*)     All other components within normal limits   ETHANOL - Abnormal; Notable for the following components:    Ethanol 302 (*)     All other components within normal limits   LIPASE       EMERGENCY DEPARTMENTCOURSE:         Vitals:    Vitals:    03/10/22 0030 03/10/22 0103 03/10/22 0115 03/10/22 0215   BP: 126/75 128/75 131/76 115/69   Pulse: 105 112 114 104   Resp: 16  18 12   Temp:       SpO2: 98%  96% 94%   Height:           The patient was given the following medications while in the emergency department:  Orders Placed This Encounter   Medications    0.9 % sodium chloride IV bolus 1,000 mL    LORazepam (ATIVAN) injection 0.5 mg    0.9 % sodium chloride IV bolus 1,000 mL    LORazepam (ATIVAN) injection 0.5 mg     CONSULTS:  None    FINAL IMPRESSION      1. Alcohol withdrawal syndrome with complication [facility])          DISPOSITION/PLAN   DISPOSITION Decision To Admit 03/10/2022 12:49:35 AM      PATIENT REFERRED TO:  No follow-up provider specified. DISCHARGE MEDICATIONS:  New Prescriptions    No medications on file     The care is provided during an unprecedented national emergency due to the novel coronavirus, COVID 19.   MD Clare Martin MD  03/10/22 1822

## 2022-03-11 LAB
SARS-COV-2, RAPID: NOT DETECTED
SPECIMEN DESCRIPTION: NORMAL

## 2022-03-11 PROCEDURE — 6370000000 HC RX 637 (ALT 250 FOR IP): Performed by: INTERNAL MEDICINE

## 2022-03-11 PROCEDURE — 2580000003 HC RX 258: Performed by: NURSE PRACTITIONER

## 2022-03-11 PROCEDURE — 87635 SARS-COV-2 COVID-19 AMP PRB: CPT

## 2022-03-11 PROCEDURE — 6370000000 HC RX 637 (ALT 250 FOR IP): Performed by: NURSE PRACTITIONER

## 2022-03-11 PROCEDURE — 6360000002 HC RX W HCPCS: Performed by: NURSE PRACTITIONER

## 2022-03-11 PROCEDURE — 99232 SBSQ HOSP IP/OBS MODERATE 35: CPT | Performed by: INTERNAL MEDICINE

## 2022-03-11 PROCEDURE — 2060000000 HC ICU INTERMEDIATE R&B

## 2022-03-11 PROCEDURE — 2500000003 HC RX 250 WO HCPCS: Performed by: NURSE PRACTITIONER

## 2022-03-11 RX ORDER — M-VIT,TX,IRON,MINS/CALC/FOLIC 27MG-0.4MG
1 TABLET ORAL DAILY
Qty: 100 TABLET | Refills: 0 | Status: SHIPPED | OUTPATIENT
Start: 2022-03-11

## 2022-03-11 RX ORDER — M-VIT,TX,IRON,MINS/CALC/FOLIC 27MG-0.4MG
1 TABLET ORAL DAILY
Status: CANCELLED | OUTPATIENT
Start: 2022-03-11

## 2022-03-11 RX ORDER — CLONIDINE HYDROCHLORIDE 0.1 MG/1
0.1 TABLET ORAL 2 TIMES DAILY PRN
Status: CANCELLED | OUTPATIENT
Start: 2022-03-11 | End: 2022-03-14

## 2022-03-11 RX ORDER — PANTOPRAZOLE SODIUM 40 MG/1
40 TABLET, DELAYED RELEASE ORAL
Status: CANCELLED | OUTPATIENT
Start: 2022-03-12

## 2022-03-11 RX ORDER — CLONIDINE HYDROCHLORIDE 0.1 MG/1
0.1 TABLET ORAL 2 TIMES DAILY
Status: DISCONTINUED | OUTPATIENT
Start: 2022-03-11 | End: 2022-03-12 | Stop reason: HOSPADM

## 2022-03-11 RX ORDER — CARVEDILOL 3.12 MG/1
3.12 TABLET ORAL 2 TIMES DAILY WITH MEALS
Status: DISCONTINUED | OUTPATIENT
Start: 2022-03-11 | End: 2022-03-12 | Stop reason: HOSPADM

## 2022-03-11 RX ORDER — CARVEDILOL 3.12 MG/1
3.12 TABLET ORAL 2 TIMES DAILY WITH MEALS
Status: CANCELLED | OUTPATIENT
Start: 2022-03-11

## 2022-03-11 RX ADMIN — MULTIPLE VITAMINS W/ MINERALS TAB 1 TABLET: TAB at 10:17

## 2022-03-11 RX ADMIN — SODIUM CHLORIDE: 9 INJECTION, SOLUTION INTRAVENOUS at 08:41

## 2022-03-11 RX ADMIN — CHLORDIAZEPOXIDE HYDROCHLORIDE 25 MG: 25 CAPSULE ORAL at 10:17

## 2022-03-11 RX ADMIN — ACETAMINOPHEN 650 MG: 325 TABLET, FILM COATED ORAL at 20:14

## 2022-03-11 RX ADMIN — CLONIDINE HYDROCHLORIDE 0.1 MG: 0.1 TABLET ORAL at 20:12

## 2022-03-11 RX ADMIN — ACETAMINOPHEN 650 MG: 325 TABLET, FILM COATED ORAL at 10:18

## 2022-03-11 RX ADMIN — CARVEDILOL 3.12 MG: 3.12 TABLET, FILM COATED ORAL at 10:17

## 2022-03-11 RX ADMIN — ENOXAPARIN SODIUM 40 MG: 40 INJECTION SUBCUTANEOUS at 10:18

## 2022-03-11 RX ADMIN — FOLIC ACID: 5 INJECTION, SOLUTION INTRAMUSCULAR; INTRAVENOUS; SUBCUTANEOUS at 10:26

## 2022-03-11 RX ADMIN — CHLORDIAZEPOXIDE HYDROCHLORIDE 25 MG: 25 CAPSULE ORAL at 20:12

## 2022-03-11 RX ADMIN — CLONIDINE HYDROCHLORIDE 0.1 MG: 0.1 TABLET ORAL at 13:19

## 2022-03-11 RX ADMIN — CHLORDIAZEPOXIDE HYDROCHLORIDE 25 MG: 25 CAPSULE ORAL at 13:19

## 2022-03-11 RX ADMIN — CARVEDILOL 3.12 MG: 3.12 TABLET, FILM COATED ORAL at 17:11

## 2022-03-11 RX ADMIN — ONDANSETRON 4 MG: 4 TABLET, ORALLY DISINTEGRATING ORAL at 20:16

## 2022-03-11 ASSESSMENT — PAIN SCALES - GENERAL
PAINLEVEL_OUTOF10: 5
PAINLEVEL_OUTOF10: 0
PAINLEVEL_OUTOF10: 2

## 2022-03-11 NOTE — PROGRESS NOTES
Spoke with [facility] intake regarding patient , patient is wanting to be admitted voluntarily family brought some personal belongings cell phone  and clothing for his inpatient stay patient will be admitted to room 225 bed 2 sometime today.

## 2022-03-11 NOTE — CONSULTS
Department of Psychiatry  Attending Physician Psychiatric Consult       Patient was seen and examined in person, chart reviewed, case discussed with staff/team     Reason for consult:   Concerns for suicidal ideation     HISTORY OF PRESENT ILLNESS:     Patient is a 70-year-old male with extensive history of depression and anxiety admitted for alcohol withdrawals. Psychiatrist consulted for suicidal ideation. Patient reports that he has been abusing 15-20 shots of liquor every day and has been dealing with multiple stressors. Reports that he recently lost his job and his place. Reports that he has been dealing with worsening depression for last few weeks. Endorses anhedonia. Reports constant feelings of helplessness hopelessness and worthlessness. Reports poor energy and concentration. Reports having trouble falling asleep and staying asleep. Reports poor appetite. Patient does report that he has been having constant suicidal thoughts and a plan to kill self by drinking himself to death. Mentions that he had no intent at that time  Discussed with him at length about the option of inpatient psychiatric hospitalization and he was agreeable to try it. Mentions that he eventually has plans to go to a sober living facility from here. Per care coordination note from nurse practitioner [facility]  Patient is a 70-year-old male with a history of depression and anxiety. Patient also endorses a significant history of alcohol use disorder. Patient was seen at bedside for evaluation with a sitter present. Patient presented to be uncomfortable and in acute distress related to reported alcohol withdrawal symptoms. Patient states that he experienced auditory hallucinations of his cell phone ringing when it was not earlier today. He relates this to withdrawal symptoms. Denies history of any additional auditory hallucinations.   Patient states he has not started severe withdrawal symptoms yet, and is worried about returning to the community and getting sick. Patient was visibly lethargic throughout interview with eyes shut most of the time. Prior to admission patient reports drinking 15-20 shots of liquor per day for last few days related to recent break-up. Patient reports having suicidal thoughts a few days ago, for 1 day. Patient states that he had no plan or intention to harm himself and the thoughts were passive and went away. Patient denies drinking with intention to harm himself. Patient states alcohol consumption was related to desire to numb his pain and denies any thoughts or desire to harm himself or end his life.     Patient does endorse significant depression at this time as an 8 out of 10 on a 1-10 scale (1 being low and 10 being high). Patient reports she has been dealing with depression since high school and relates current depression to job loss and loss of apartment. Patient states recently his sleep has been affected, has had a reduction of interest, reduction in energy and reduction in concentration. Patient reports his appetite is \"on and off\". Patient reports struggling with feelings of guilt and worthlessness. Patient states he had suicidal ideations 1 time in high school with plan to overdose however never actually attempted to harm himself. Patient reported multiple times if discharged, he would be able to maintain safety. Patient reports his main concern is sobriety and states he would benefit from further hospitalization to prevent him from drinking. Patient was encouraged to follow-up with outpatient therapy and alcohol treatment and patient reports desire to do this. When discussing support in the community patient endorses having significant support from friends and family. Patient reports coping skills of cooking, reading and art if desire to drink returns.   Patient also endorses someone from Medicaid talked with him about setting up outpatient care for mental health treatment. He reports desire to follow through with therapy and medication management of depression. Patient may benefit from treatment with antidepressant. Patient reports that he would not benefit from inpatient hospitalization related to safety. It is this author's assessment that patient will be able to effectively address mental health symptoms outpatient and is not a safety risk to himself at time of interview.     When discussing alcohol consumption patient reports that he has been drinking significantly since 21years old. Patient endorses a period of sobriety for 3 months about a year ago. Patient reports drinking 15-20 shots per day for the last few days. Patient states the last time he drank was 5:30 PM prior to admission. Blood alcohol level was 0.302 upon admitting labs. When discussing illicit drug use patient reports marijuana consumption 5 days of the week. Patient endorses a history of experimenting with cocaine.     PSYCHIATRIC HISTORY: Endorses  Currently follows with no outpatient provider. Reports previously following [facility]. Patient states issues with insurance provided further appointments. Lifetime suicide attempts: No previous attempts. 1 previous occurrence of suicidal ideation in high school.   Previous psychiatric hospital admissions: Reports 2 inpatient psychiatric hospitalizations at [facility].     Past psychiatric medications includes: Seroquel and Wellbutrin     Adverse reactions from psychotropic medications: Denies     Lifetime Psychiatric Review of Systems       Depression: Endorses     Nirali or Hypomania: Denies     Panic Attacks: Endorses     Phobias: Denies     Obsessions and Compulsions: Denies      Body or Vocal Tics: Denies     Hallucinations: Denies     Delusions: Denies     Past Medical History:    Past Medical History        Diagnosis Date    Moderate episode of recurrent major depressive disorder ([facility] [address] 12/8/2016            Past Surgical History:    Past Surgical History             Procedure Laterality Date    TYMPANOSTOMY TUBE PLACEMENT Bilateral 1996    WRIST FRACTURE SURGERY Right 2011            Allergies:  Grass pollen(k-o-r-t-swt ashutosh)     Social History:     Patient was born [place]. Raised by his parents who split up when he was 10years old. Patient reports being raised at the time by his mother and half-time by his father. Patient reports currently being close to both. Patient states he has 1 sister, 1 stepsister and one half brother. Patient reports not graduating from high school. Patient currently works at [employer]. Patient denies ever being  and reports having no kids. Patient states he is living with his dad for the time being after recently losing his apartment. Patient reports assets as significant support in the community through friends and family.        DRUG USE HISTORY  Social History           Tobacco Use   Smoking Status Current Every Day Smoker    Packs/day: 0.50    Types: E-Cigarettes   Smokeless Tobacco Never Used      Social History           Substance and Sexual Activity   Alcohol Use Yes    Alcohol/week: 15.0 standard drinks    Types: 15 Shots of liquor per week     Comment: 15 shots of whiskey/vodka a day      Social History           Substance and Sexual Activity   Drug Use Yes    Types: Marijuana (Weed)     Comment: daily      When discussing alcohol consumption patient reports that he has been drinking significantly since 21years old. Patient endorses a period of sobriety for 3 months about a year ago. Patient reports drinking 15-20 shots per day for the last few days. Patient states the last time he drank was 5:30 PM prior to admission. Blood alcohol level was 0.302 upon admitting labs. When discussing illicit drug use patient reports marijuana consumption 5 days of the week.   Patient endorses a history of experimenting with cocaine.     LEGAL HISTORY:   HISTORY OF INCARCERATION: Denies     Family cranial nerves II-XII grossly in tact, normal gait and station  Skin: Skin is warm and dry. Patient is not diaphoretic. No erythema.       Mental Status Examination:    Level of consciousness:   Lethargic  Appearance:  Casually dressed, good grooming and hygiene  Behavior/Motor:  Tired, closing eyes for most of interview  Attitude toward examiner:  Cooperative  Speech: normal rate and volume  Mood:  depressed  Affect:  Congruent  Thought processes:  Goal-Directed, linear, coherent  Thought content:  Suicidal ideations:  Reports passive suicidal thoughts                    Homicidal ideations: Denies                        Hallucinations: Endorses auditory hallucinations of cell phone ringing              Delusions: Denies  Cognition:  Oriented to self, location, time, situation  Concentration within normal limits  Memory: recent and remote memory intact  Insight &Judgment: Fair     ASSESSMENT:   Major depressive disorder, recurrent, severe, without suicidal ideation. Alcohol use disorder severe dependence  Acute alcohol withdrawal    PLAN:  Offered inpatient psychiatric admission to help with suicidal thoughts and patient was agreeable. Can admit to [facility] if he is voluntarily willing to come in. If patient request to go home, can be discharged home. Recommend social work to connect him with AOD services. Can discontinue one-to-one sitter  We will sign off. Please call back with any questions.       Electronically signed by Jarrod Anderson MD on 3/10/22 at 7:05 PM EST

## 2022-03-11 NOTE — CARE COORDINATION
ONGOING DISCHARGE PLAN:    Patient is alert and oriented x4. Spoke with patient regarding discharge plan and patient confirms that plan is still to Transfer to [facility]. Per Notes, Pt. Has been medically cleared. Psych on board. Will continue to follow for additional discharge needs.     Electronically signed by Reta Corrales RN on 3/11/2022 at 11:39 AM

## 2022-03-11 NOTE — PLAN OF CARE
Patient is medically cleared to be transferred to [facility].   Bambi Medrano MD

## 2022-03-11 NOTE — PROGRESS NOTES
Report called to [facility] , concerns of elevated b/p full set of vitals obtained patient has mews score of 3 , will contact physician to see if any new orders for abnormal vitals sign prior to transfer.

## 2022-03-12 ENCOUNTER — HOSPITAL ENCOUNTER (INPATIENT)
Age: 27
LOS: 3 days | Discharge: HOME | DRG: 885 | End: 2022-03-15
Attending: PSYCHIATRY & NEUROLOGY | Admitting: PSYCHIATRY & NEUROLOGY

## 2022-03-12 VITALS
OXYGEN SATURATION: 99 % | TEMPERATURE: 98 F | HEART RATE: 100 BPM | DIASTOLIC BLOOD PRESSURE: 87 MMHG | RESPIRATION RATE: 18 BRPM | WEIGHT: 240.74 LBS | SYSTOLIC BLOOD PRESSURE: 132 MMHG | BODY MASS INDEX: 32.61 KG/M2 | HEIGHT: 72 IN

## 2022-03-12 PROBLEM — E87.6 HYPOKALEMIA: Status: ACTIVE | Noted: 2022-03-12

## 2022-03-12 PROBLEM — R94.5 ABNORMAL LIVER FUNCTION: Status: ACTIVE | Noted: 2022-03-12

## 2022-03-12 PROBLEM — E83.42 HYPOMAGNESEMIA: Status: ACTIVE | Noted: 2022-03-12

## 2022-03-12 PROBLEM — R45.851 DEPRESSION WITH SUICIDAL IDEATION: Status: ACTIVE | Noted: 2022-03-12

## 2022-03-12 PROBLEM — R00.0 TACHYCARDIA: Status: ACTIVE | Noted: 2022-03-12

## 2022-03-12 PROBLEM — E66.09 CLASS 1 OBESITY DUE TO EXCESS CALORIES WITHOUT SERIOUS COMORBIDITY WITH BODY MASS INDEX (BMI) OF 30.0 TO 30.9 IN ADULT: Status: ACTIVE | Noted: 2022-03-12

## 2022-03-12 PROBLEM — F32.A DEPRESSION WITH SUICIDAL IDEATION: Status: ACTIVE | Noted: 2022-03-12

## 2022-03-12 PROBLEM — F10.20 ALCOHOLISM (HCC): Status: ACTIVE | Noted: 2022-03-12

## 2022-03-12 PROCEDURE — 6360000002 HC RX W HCPCS: Performed by: NURSE PRACTITIONER

## 2022-03-12 PROCEDURE — 99222 1ST HOSP IP/OBS MODERATE 55: CPT | Performed by: INTERNAL MEDICINE

## 2022-03-12 PROCEDURE — 99239 HOSP IP/OBS DSCHRG MGMT >30: CPT | Performed by: INTERNAL MEDICINE

## 2022-03-12 PROCEDURE — 6370000000 HC RX 637 (ALT 250 FOR IP): Performed by: INTERNAL MEDICINE

## 2022-03-12 PROCEDURE — 6370000000 HC RX 637 (ALT 250 FOR IP): Performed by: PSYCHIATRY & NEUROLOGY

## 2022-03-12 PROCEDURE — 1240000000 HC EMOTIONAL WELLNESS R&B

## 2022-03-12 PROCEDURE — 2580000003 HC RX 258: Performed by: NURSE PRACTITIONER

## 2022-03-12 PROCEDURE — 6370000000 HC RX 637 (ALT 250 FOR IP): Performed by: NURSE PRACTITIONER

## 2022-03-12 RX ORDER — CHLORDIAZEPOXIDE HYDROCHLORIDE 10 MG/1
10 CAPSULE, GELATIN COATED ORAL 3 TIMES DAILY
Status: DISCONTINUED | OUTPATIENT
Start: 2022-03-12 | End: 2022-03-12 | Stop reason: HOSPADM

## 2022-03-12 RX ORDER — IBUPROFEN 400 MG/1
400 TABLET ORAL EVERY 6 HOURS PRN
Status: DISCONTINUED | OUTPATIENT
Start: 2022-03-12 | End: 2022-03-15 | Stop reason: HOSPADM

## 2022-03-12 RX ORDER — M-VIT,TX,IRON,MINS/CALC/FOLIC 27MG-0.4MG
1 TABLET ORAL DAILY
Status: DISCONTINUED | OUTPATIENT
Start: 2022-03-13 | End: 2022-03-15 | Stop reason: HOSPADM

## 2022-03-12 RX ORDER — NICOTINE 21 MG/24HR
1 PATCH, TRANSDERMAL 24 HOURS TRANSDERMAL DAILY
Status: DISCONTINUED | OUTPATIENT
Start: 2022-03-12 | End: 2022-03-15 | Stop reason: HOSPADM

## 2022-03-12 RX ORDER — HYDROXYZINE 50 MG/1
50 TABLET, FILM COATED ORAL 3 TIMES DAILY PRN
Status: DISCONTINUED | OUTPATIENT
Start: 2022-03-12 | End: 2022-03-15 | Stop reason: HOSPADM

## 2022-03-12 RX ORDER — CHLORDIAZEPOXIDE HYDROCHLORIDE 10 MG/1
10 CAPSULE, GELATIN COATED ORAL 3 TIMES DAILY
Status: DISCONTINUED | OUTPATIENT
Start: 2022-03-12 | End: 2022-03-14

## 2022-03-12 RX ORDER — ACETAMINOPHEN 325 MG/1
650 TABLET ORAL EVERY 4 HOURS PRN
Status: DISCONTINUED | OUTPATIENT
Start: 2022-03-12 | End: 2022-03-15 | Stop reason: HOSPADM

## 2022-03-12 RX ORDER — CHLORDIAZEPOXIDE HYDROCHLORIDE 10 MG/1
10 CAPSULE, GELATIN COATED ORAL 3 TIMES DAILY
Status: DISCONTINUED | OUTPATIENT
Start: 2022-03-12 | End: 2022-03-12

## 2022-03-12 RX ORDER — CHLORDIAZEPOXIDE HYDROCHLORIDE 10 MG/1
10 CAPSULE, GELATIN COATED ORAL 3 TIMES DAILY
Status: CANCELLED | OUTPATIENT
Start: 2022-03-12

## 2022-03-12 RX ORDER — MAGNESIUM HYDROXIDE/ALUMINUM HYDROXICE/SIMETHICONE 120; 1200; 1200 MG/30ML; MG/30ML; MG/30ML
30 SUSPENSION ORAL EVERY 6 HOURS PRN
Status: DISCONTINUED | OUTPATIENT
Start: 2022-03-12 | End: 2022-03-15 | Stop reason: HOSPADM

## 2022-03-12 RX ORDER — SERTRALINE HYDROCHLORIDE 25 MG/1
25 TABLET, FILM COATED ORAL DAILY
Status: DISCONTINUED | OUTPATIENT
Start: 2022-03-12 | End: 2022-03-14

## 2022-03-12 RX ORDER — ESCITALOPRAM OXALATE 10 MG/1
10 TABLET ORAL DAILY
Status: ON HOLD | COMMUNITY
End: 2022-03-15 | Stop reason: HOSPADM

## 2022-03-12 RX ORDER — POLYETHYLENE GLYCOL 3350 17 G/17G
17 POWDER, FOR SOLUTION ORAL DAILY PRN
Status: DISCONTINUED | OUTPATIENT
Start: 2022-03-12 | End: 2022-03-15 | Stop reason: HOSPADM

## 2022-03-12 RX ORDER — PANTOPRAZOLE SODIUM 40 MG/1
40 TABLET, DELAYED RELEASE ORAL
Status: DISCONTINUED | OUTPATIENT
Start: 2022-03-13 | End: 2022-03-15 | Stop reason: HOSPADM

## 2022-03-12 RX ORDER — CLONIDINE HYDROCHLORIDE 0.1 MG/1
0.1 TABLET ORAL 2 TIMES DAILY PRN
Status: DISCONTINUED | OUTPATIENT
Start: 2022-03-12 | End: 2022-03-15 | Stop reason: HOSPADM

## 2022-03-12 RX ORDER — CARVEDILOL 3.12 MG/1
3.12 TABLET ORAL 2 TIMES DAILY WITH MEALS
Status: DISCONTINUED | OUTPATIENT
Start: 2022-03-12 | End: 2022-03-15 | Stop reason: HOSPADM

## 2022-03-12 RX ORDER — TRAZODONE HYDROCHLORIDE 50 MG/1
50 TABLET ORAL NIGHTLY PRN
Status: DISCONTINUED | OUTPATIENT
Start: 2022-03-12 | End: 2022-03-15 | Stop reason: HOSPADM

## 2022-03-12 RX ADMIN — ENOXAPARIN SODIUM 40 MG: 40 INJECTION SUBCUTANEOUS at 08:41

## 2022-03-12 RX ADMIN — NICOTINE POLACRILEX 4 MG: 2 GUM, CHEWING BUCCAL at 18:00

## 2022-03-12 RX ADMIN — CHLORDIAZEPOXIDE HYDROCHLORIDE 10 MG: 10 CAPSULE ORAL at 13:06

## 2022-03-12 RX ADMIN — CHLORDIAZEPOXIDE HYDROCHLORIDE 10 MG: 10 CAPSULE ORAL at 20:05

## 2022-03-12 RX ADMIN — CHLORDIAZEPOXIDE HYDROCHLORIDE 25 MG: 25 CAPSULE ORAL at 08:41

## 2022-03-12 RX ADMIN — TRAZODONE HYDROCHLORIDE 50 MG: 50 TABLET ORAL at 21:19

## 2022-03-12 RX ADMIN — HYDROXYZINE HYDROCHLORIDE 50 MG: 50 TABLET, FILM COATED ORAL at 17:25

## 2022-03-12 RX ADMIN — CARVEDILOL 3.12 MG: 3.12 TABLET, FILM COATED ORAL at 08:42

## 2022-03-12 RX ADMIN — NICOTINE POLACRILEX 4 MG: 2 GUM, CHEWING BUCCAL at 20:27

## 2022-03-12 RX ADMIN — CLONIDINE HYDROCHLORIDE 0.1 MG: 0.1 TABLET ORAL at 08:42

## 2022-03-12 RX ADMIN — HYDROXYZINE HYDROCHLORIDE 50 MG: 50 TABLET, FILM COATED ORAL at 21:19

## 2022-03-12 RX ADMIN — SERTRALINE HYDROCHLORIDE 25 MG: 25 TABLET ORAL at 17:14

## 2022-03-12 RX ADMIN — CARVEDILOL 3.12 MG: 3.12 TABLET, FILM COATED ORAL at 17:14

## 2022-03-12 RX ADMIN — ACETAMINOPHEN 650 MG: 325 TABLET, FILM COATED ORAL at 03:05

## 2022-03-12 RX ADMIN — Medication 5 ML: at 08:42

## 2022-03-12 RX ADMIN — MULTIPLE VITAMINS W/ MINERALS TAB 1 TABLET: TAB at 08:42

## 2022-03-12 ASSESSMENT — SLEEP AND FATIGUE QUESTIONNAIRES
SLEEP PATTERN: RESTLESSNESS
AVERAGE NUMBER OF SLEEP HOURS: 4
DIFFICULTY STAYING ASLEEP: YES
DO YOU USE A SLEEP AID: NO
DIFFICULTY ARISING: YES
RESTFUL SLEEP: NO
DO YOU HAVE DIFFICULTY SLEEPING: YES
DIFFICULTY FALLING ASLEEP: YES

## 2022-03-12 ASSESSMENT — PAIN SCALES - GENERAL
PAINLEVEL_OUTOF10: 2
PAINLEVEL_OUTOF10: 0

## 2022-03-12 ASSESSMENT — LIFESTYLE VARIABLES: HISTORY_ALCOHOL_USE: YES

## 2022-03-12 ASSESSMENT — PATIENT HEALTH QUESTIONNAIRE - PHQ9: SUM OF ALL RESPONSES TO PHQ QUESTIONS 1-9: 22

## 2022-03-12 NOTE — PROGRESS NOTES
Rn placed call to charge RN in [facility]. RN informed that the charge RN was in the middle of a code and would attempt to call back later.

## 2022-03-12 NOTE — CARE COORDINATION
ONGOING DISCHARGE PLAN:    Plan remains for Pt. To transfer to [facility] today. Will continue to follow for additional discharge needs.     Electronically signed by Alma Stone RN on 3/12/2022 at 11:52 AM

## 2022-03-12 NOTE — PLAN OF CARE
Medically cleared for discharge to [facility] today  Blood pressure has been controlled  Yosi Banuelos MD

## 2022-03-12 NOTE — H&P
(ZOLOFT) 25 MG tablet Take 1 tablet by mouth daily 3/13/17   Gabriela Amin MD        Allergies:     Grass pollen(k-o-r-t-swt ashutosh)    Social History:     Tobacco:    reports that he has been smoking e-cigarettes. He has been smoking about 0.50 packs per day. He has never used smokeless tobacco.  Alcohol:      reports current alcohol use of about 15.0 standard drinks of alcohol per week. Drug Use:  reports current drug use. Drug: Marijuana Carroll Garcia). Family History:     Family History   Problem Relation Age of Onset    Depression Mother     Depression Sister     Depression Maternal Aunt     Heart Attack Neg Hx     Stroke Neg Hx     Cancer Neg Hx     Diabetes Neg Hx        Review of Systems:     Positive and Negative as described in HPI. CONSTITUTIONAL:  negative for fevers, chills, sweats, fatigue, weight loss  HEENT:  negative for vision, hearing changes, runny nose, throat pain  RESPIRATORY:  negative for shortness of breath, cough, congestion, wheezing. CARDIOVASCULAR:  negative for chest pain, palpitations.   GASTROINTESTINAL:  negative for nausea, vomiting, diarrhea, constipation, change in bowel habits, abdominal pain   GENITOURINARY:  negative for difficulty of urination, burning with urination, frequency   INTEGUMENT:  negative for rash, skin lesions, easy bruising   HEMATOLOGIC/LYMPHATIC:  negative for swelling/edema   ALLERGIC/IMMUNOLOGIC:  negative for urticaria , itching  ENDOCRINE:  negative increase in drinking, increase in urination, hot or cold intolerance  MUSCULOSKELETAL:  negative joint pains, muscle aches, swelling of joints  NEUROLOGICAL:  negative for headaches, dizziness, lightheadedness, numbness, pain, tingling extremities  BEHAVIOR/PSYC    Physical Exam:     /89   Pulse 105   Temp 98 °F (36.7 °C) (Infrared)   Resp 14   Ht 6' (1.829 m)   Wt 228 lb (103.4 kg)   SpO2 98%   BMI 30.92 kg/m²   Temp (24hrs), Av.9 °F (37.2 °C), Min:98 °F (36.7 °C), Max:100.2 °F (37.9 °C)    No results for input(s): POCGLU in the last 72 hours. No intake or output data in the 24 hours ending 03/12/22 1826    General Appearance:  alert, well appearing, and in no acute distress  Mental status: oriented to person, place, and time with normal affect  Head:  normocephalic, atraumatic. Eye: no icterus, redness, pupils equal and reactive, extraocular eye movements intact, conjunctiva clear  Ear: normal external ear, no discharge, hearing intact  Nose:  no drainage noted  Mouth: mucous membranes moist  Neck: supple, no carotid bruits, thyroid not palpable  Lungs: Bilateral equal air entry, clear to ausculation, no wheezing, rales or rhonchi, normal effort  Cardiovascular: normal rate, regular rhythm, no murmur, gallop, rub. Abdomen: Soft, nontender, nondistended, normal bowel sounds, no hepatomegaly or splenomegaly  Neurologic: There are no new focal motor or sensory deficits, normal muscle tone and bulk, no abnormal sensation, normal speech, cranial nerves II through XII grossly intact  Skin: No gross lesions, rashes, bruising or bleeding on exposed skin area  Extremities:  peripheral pulses palpable, no pedal edema or calf pain with palpation  Psych: normal affect    Investigations:      Laboratory Testing:  No results found for this or any previous visit (from the past 24 hour(s)). Imaging/Diagonstics:  No results found.     Assessment :      Hospital Problems           Last Modified POA    * (Principal) Depression with suicidal ideation 3/12/2022 Yes    Hx of substance abuse ([address] (Chronic) 3/12/2022 Yes    Abnormal liver function 3/12/2022 Yes    Alcoholism ([address] 3/12/2022 Yes    Class 1 obesity due to excess calories without serious comorbidity with body mass index (BMI) of 30.0 to 30.9 in adult 3/12/2022 Yes    Hypokalemia 3/12/2022 Yes    Hypomagnesemia 3/12/2022 Yes    Tachycardia 3/12/2022 Yes          Plan:     3 42-year-old gentleman with underlying history of alcoholism, admitted to inpatient psych for suicidal ideations,  2. Alcohol intoxication, monitor for withdrawal,  3. Hypokalemia, replace,  4. Tachycardia, possible alcohol-related withdrawal, started on Coreg,  5. Hypomagnesemia, recheck the levels and replace,  6. Obesity, low-calorie diet,  7. Full CODE STATUS    Consultations:   IP CONSULT TO INTERNAL MEDICINE  IP CONSULT TO SOCIAL WORK      Amy Kessler MD  3/12/2022  6:26 PM    Copy sent to Dr. Worthy primary care provider on file. Please note that this chart was generated using voice recognition Dragon dictation software. Although every effort was made to ensure the accuracy of this automated transcription, some errors in transcription may have occurred.

## 2022-03-12 NOTE — PLAN OF CARE
Problem: Discharge Planning:  Goal: Discharged to appropriate level of care  Description: Discharged to appropriate level of care  Outcome: Ongoing  Note: Plan to discharge to [facility]. Observing additional night due to high blood pressures. Continuing to monitor. Problem: Fluid Volume - Deficit:  Goal: Absence of fluid volume deficit signs and symptoms  Description: Absence of fluid volume deficit signs and symptoms  Outcome: Ongoing     Problem: Nutrition Deficit:  Goal: Ability to achieve adequate nutritional intake will improve  Description: Ability to achieve adequate nutritional intake will improve  Outcome: Ongoing     Problem: Sleep Pattern Disturbance:  Goal: Appears well-rested  Description: Appears well-rested  Outcome: Ongoing     Problem: Violence - Risk of, Self/Other-Directed:  Goal: Knowledge of developmental care interventions  Description: Absence of violence  Outcome: Ongoing     Problem: Skin Integrity:  Goal: Will show no infection signs and symptoms  Description: Will show no infection signs and symptoms  Outcome: Ongoing  Goal: Absence of new skin breakdown  Description: Absence of new skin breakdown  Outcome: Ongoing  Note: Patient skin assessed, no new signs of skin breakdown. Patient able to turn per self.

## 2022-03-12 NOTE — PROGRESS NOTES
Behavioral Services  Medicare Certification Upon Admission    I certify that this patient's inpatient psychiatric hospital admission is medically necessary for:    [x] (1) Treatment which could reasonably be expected to improve this patient's condition,       [x] (2) Or for diagnostic study;     AND     [x](2) The inpatient psychiatric services are provided while the individual is under the care of a physician and are included in the individualized plan of care.     Estimated length of stay/service -3-9 days    Plan for post-hospital care -outpatient care    Electronically signed by Pauline Larios MD on 3/12/2022 at 2:46 PM

## 2022-03-12 NOTE — BH NOTE
[facility]  Admission Note     Admission Type:   Admission Type: Voluntary    Reason for admission:  Reason for Admission: suicidal ideations/alcohol abuse    PATIENT STRENGTHS:  Strengths: Communication,Motivated,Positive Support,Social Skills    Patient Strengths and Limitations:  Limitations: Inappropriate/potentially harmful leisure interests,Difficult relationships / poor social skills    Addictive Behavior:   Addictive Behavior  In the past 3 months, have you felt or has someone told you that you have a problem with:  : None  Do you have a history of Chemical Use?: No  Do you have a history of Alcohol Use?: Yes  Do you have a history of Street Drug Abuse?: No  Histroy of Prescripton Drug Abuse?: No    Medical Problems:   Past Medical History:   Diagnosis Date    Moderate episode of recurrent major depressive disorder ([facility] [address] 12/8/2016       Status EXAM:  Status and Exam  Normal: No  Facial Expression: Flat  Affect: Appropriate  Level of Consciousness: Alert  Mood:Normal: No  Mood: Depressed,Anxious  Motor Activity:Normal: No  Motor Activity: Decreased  Interview Behavior: Cooperative  Preception: [place] to Person,[place] to Time,[place] to Place,[place] to Situation  Attention:Normal: No  Attention: Distractible  Thought Processes: Circumstantial  Thought Content:Normal: Yes (Logical)  Hallucinations: None  Delusions: No  Memory:Normal: Yes  Insight and Judgment: No  Insight and Judgment: Poor Insight,Poor Judgment  Present Suicidal Ideation: No  Present Homicidal Ideation: No    Tobacco Screening:  Practical Counseling, on admission, brock X, if applicable and completed (first 3 are required if patient doesn't refuse):            ( )  Recognizing danger situations (included triggers and roadblocks)                    ( )  Coping skills (new ways to manage stress, exercise, relaxation techniques, changing routine, distraction)                                                           ( )  Basic information about quitting (benefits of quitting, techniques in how to quit, available resources  ( ) Referral for counseling faxed to [facility]                                           ( ) Patient refused counseling  ( ) Patient has not smoked in the last 30 days    Metabolic Screening:    No results found for: LABA1C    No results found for: CHOL  No results found for: TRIG  No results found for: HDL  No components found for: LDLCAL  No results found for: LABVLDL      Body mass index is 30.92 kg/m². BP Readings from Last 2 Encounters:   03/12/22 125/79   03/12/22 132/87           Pt admitted with followings belongings:  Dental Appliances: None  Vision - Corrective Lenses: None  Hearing Aid: None  Jewelry: Other (Comment) (nose ring)  Body Piercings Removed: No  Clothing: Footwear,Pants,Shirt,Socks,Undergarments (Comment),Other (Comment) (hat, blanket, shorts, brush)  Were All Patient Medications Collected?: Not Applicable  Other Valuables: Cell phone,Wallet,Other (Comment) (2 vapes, , 11.43$, [address], 's license, ins card)     Patient's home medications were verified at [employer]. Patient oriented to surroundings and program expectations and copy of patient rights given. Received admission packet:  yes. Consents reviewed, signed yes. Refused no. Patient verbalize understanding:  yes. Patient education on precautions: yes  Patient admitted from medical. Patient transferred in wheelchair accompanied by 2 [facility] staff. Patient alert and orient x 4. Speech clear. Patient ambulates with steady gait. Patient searched and wanded. Patient oriented to unit and assigned room. Food/fluids provided. Support provided. Safety maintained.                     Leonor Meraz RN

## 2022-03-12 NOTE — DISCHARGE SUMMARY
[facility]    Discharge Summary     Patient ID: Anitha Goel  :  1995   MRN: [id number]     ACCOUNT:  [de-identified]   Patient's PCP: No primary care provider on file. Admit Date: 3/9/2022   Discharge Date: 3/12/22  Length of Stay: 2  Code Status:  Full Code  Admitting Physician: Nicole Garcia MD  Discharge Physician: Nicole Garcia MD     Active Discharge Diagnoses:     Primary Problem  Alcohol abuse with withdrawal [facility])      [place] Problems    Diagnosis Date Noted    Alcohol abuse with withdrawal [facility]) [F10.139] 03/10/2022       Admission Condition:  fair     Discharged Condition: fair    Hospital Stay:     Hospital Course:  Anitha Goel is a 32 y.o. male who was admitted for the management of Alcohol abuse with withdrawal ([facility] [address] , presented to ER with Withdrawal (alcohol) and Depression    This patient is a 32 y.o. Non- / non  malewho presents with  63-year-old  male past medical history of substance abuse and recurrent major depressive disorder who presents to the ED for alcohol withdrawal and depression.  According to patient, he has been drinking 15-20 shots of liquor every day for the past several days  History of heavy drinking for last several years  Reports auditory hallucinations previously when undergoing withdrawal  Requesting help with withdrawal  Patient was anxious tremulous restless at presentation  Voiced suicidal ideation on admission-psychiatry consulted    1. Alcohol intoxication, dependence -admitted for management of withdrawal symptoms.  Has received 2.5mg of Ativan in the last 12 hours last dose 2 hours ago.  Discontinue CIWA, start Librium  2. Elevated transaminases secondary to alcohol abuse   3. suicidal thoughts-psych consult  4.  Elevated blood pressure started on Coreg, clonidine possible alcohol withdrawal contributing will need to be followed up after discharge    Patient agreeable to discharge to [facility]    Significant therapeutic interventions: As above    Significant Diagnostic Studies:   Labs / Micro:    Lab Results   Component Value Date    WBC 5.3 03/09/2022    HGB 17.8 (H) 03/09/2022    HCT 51.3 03/09/2022    MCV 90.0 03/09/2022     03/09/2022          Lab Results   Component Value Date     03/09/2022    K 3.6 03/09/2022    CL 98 03/09/2022    CO2 22 03/09/2022    BUN 6 03/09/2022    CREATININE 0.77 03/09/2022    GLUCOSE 106 03/09/2022    CALCIUM 8.9 03/09/2022          Radiology:    No results found. Consultations:    Consults:     Final Specialist Recommendations/Findings:   IP CONSULT TO PSYCHIATRY  IP CONSULT TO SOCIAL WORK  IP CONSULT TO SOCIAL WORK      The patient was seen and examined on day of discharge and this discharge summary is in conjunction with any daily progress note from day of discharge.     Discharge plan:     Disposition: [facility]      Instructions to Patient: Keep follow-up appointments      Requiring Further Evaluation/Follow Up POST HOSPITALIZATION/Incidental Findings:     Physician Follow Up:     [place] [address]  [phone]              call to get set up with PCP       Activity: Resume as directed    Discharge Medications:     Also started on Coreg     Medication List      START taking these medications    therapeutic multivitamin-minerals tablet  Take 1 tablet by mouth daily        CONTINUE taking these medications    ibuprofen 800 MG tablet  Commonly known as: ADVIL;MOTRIN  Take 1 tablet by mouth every 8 hours as needed for Pain     omeprazole 20 MG delayed release capsule  Commonly known as: PRILOSEC     sertraline 25 MG tablet  Commonly known as: ZOLOFT  Take 1 tablet by mouth daily           Where to Get Your Medications      These medications were sent to [address], [address] [place] [address]    Phone: [phone]   · therapeutic multivitamin-minerals tablet         Time Spent on discharge is  35 mins in patient examination, evaluation, counseling as well as medication reconciliation, prescriptions for required medications, discharge plan and follow up. Electronically signed by   Miranda Zimmer MD  3/12/2022  10:13 AM      Thank you Dr. Worthy primary care provider on file. for the opportunity to be involved in this patient's care.

## 2022-03-12 NOTE — PROGRESS NOTES
[place] [address] Internal Medicine    Progress Note    3/11/2022    7:21 PM    Name:   Zainab Perry  MRN:     [id number]     Acct:      [de-identified]   Room:   [facility] [place] [address] Day:  1  Admit Date:  3/9/2022  8:10 PM    PCP:   No primary care provider on file. Code Status:  Full Code    Subjective:     C/C:   Chief Complaint   Patient presents with    Withdrawal     alcohol    Depression         Interval History Status: Improving    HPI:     See HPI    Review of Systems:     Denies any shortness of breath or cough  Denies chest pain or palpitations  Denies abdominal pain, diarrhea vomiting  Denies any new numbness tremors or weakness. Medications: Allergies: Allergies   Allergen Reactions    Grass Pollen(K-O-R-T-Swt Reji)        Current Meds:   Scheduled Meds:    carvedilol  3.125 mg Oral BID WC    cloNIDine  0.1 mg Oral BID    sodium chloride flush  5-40 mL IntraVENous 2 times per day    enoxaparin  40 mg SubCUTAneous Daily    multivitamin  1 tablet Oral Daily    thiamine and folic acid IVPB   IntraVENous Daily    pantoprazole  40 mg Oral QAM AC    chlordiazePOXIDE  25 mg Oral TID     Continuous Infusions:    sodium chloride 125 mL/hr at 03/11/22 0841    sodium chloride       PRN Meds: sodium chloride flush, sodium chloride, potassium chloride **OR** potassium alternative oral replacement **OR** potassium chloride, ondansetron **OR** ondansetron, polyethylene glycol, acetaminophen **OR** acetaminophen, nicotine polacrilex    Data:     Past Medical History:   has a past medical history of Moderate episode of recurrent major depressive disorder ([facility] [address] Social History:   reports that he has been smoking e-cigarettes. He has been smoking about 0.50 packs per day. He has never used smokeless tobacco. He reports current alcohol use of about 15.0 standard drinks of alcohol per week. He reports current drug use. Drug: Marijuana [facility]pping).      Family History:   Family History   Problem Relation Age of Onset    Depression Mother     Depression Sister     Depression Maternal Aunt     Heart Attack Neg Hx     Stroke Neg Hx     Cancer Neg Hx     Diabetes Neg Hx        Vitals:  BP (!) 160/97   Pulse 112   Temp 100.2 °F (37.9 °C) (Axillary)   Resp 20   Ht 6' (1.829 m)   Wt 240 lb 11.9 oz (109.2 kg)   SpO2 95%   BMI 32.65 kg/m²   Temp (24hrs), Av.7 °F (37.1 °C), Min:97.6 °F (36.4 °C), Max:100.2 °F (37.9 °C)    No results for input(s): POCGLU in the last 72 hours. I/O (24Hr): No intake or output data in the 24 hours ending 22    Labs:    Lab Results   Component Value Date    WBC 5.3 2022    HGB 17.8 (H) 2022    HCT 51.3 2022    MCV 90.0 2022     2022     Lab Results   Component Value Date     2022    K 3.6 2022    CL 98 2022    CO2 22 2022    BUN 6 2022    CREATININE 0.77 2022    GLUCOSE 106 2022    CALCIUM 8.9 2022          Lab Results   Component Value Date/Time    SPECIAL NOT REPORTED 2017 06:45 AM    SPECIAL NOT REPORTED 2017 06:45 AM     No results found for: CULTURE      Radiology:    Recent data reviewed    Physical Examination:        General appearance:  alert, cooperative and no distress  Eyes: Anicteric sclera. Pupils are equally round and reactive to light. Extraocular movements are intact.   Lungs:  clear to auscultation bilaterally, normal effort  Heart:  regular rate and rhythm, no murmur  Abdomen:  soft, nontender, nondistended, normal bowel sounds, no masses, hepatomegaly, splenomegaly  Extremities:  no edema, redness, tenderness in the calves  Skin:  no gross lesions, rashes, induration  Neuro:  Alert, oriented X 3, no new focal weakness  Assessment:        Primary Problem  Alcohol abuse with withdrawal [facility])    Active Hospital Problems    Diagnosis Date Noted    Alcohol abuse with withdrawal ([facility] [address] [F10.139] 03/10/2022           Plan: 1. Alcohol intoxication, dependence -admitted for management of withdrawal symptoms. Has received 2.5mg of Ativan in the last 12 hours last dose 2 hours ago. Discontinue CIWA, start Librium  2.  Elevated transaminases secondary to alcohol abuse   3. suicidal thoughts-psych consult    3/11  Blood pressure elevated, add Coreg, clonidine  Remains on Librium reports feeling well  Appreciate psych input agreeable to admission to [facility]  Discharge to [facility] today        Esther Garcia MD  3/11/2022

## 2022-03-12 NOTE — PROGRESS NOTES
Spoke with dr. Ladarius Gonzalez give evening dose of Clonidine and transfer tomorrow to [facility] monitor b/p overnight.

## 2022-03-12 NOTE — BH NOTE
Patient given tobacco quitline number 13668570742 at this time, refusing to call at this time, states \" I just dont want to quit now\"- patient given information as to the dangers of long term tobacco use. Continue to reinforce the importance of tobacco cessation.

## 2022-03-12 NOTE — BH NOTE
PRN Note:  Patient verbalized anxiety. Patient accepting of 1:1 talk with staff and oral PRN Atarax 50 mg. Support and reassurance provided. Will monitor. Safety maintained.

## 2022-03-13 PROBLEM — F33.2 MDD (MAJOR DEPRESSIVE DISORDER), RECURRENT SEVERE, WITHOUT PSYCHOSIS (HCC): Status: ACTIVE | Noted: 2022-03-13

## 2022-03-13 PROBLEM — F10.239 ALCOHOL DEPENDENCE WITH WITHDRAWAL (HCC): Status: ACTIVE | Noted: 2022-03-12

## 2022-03-13 PROCEDURE — 6370000000 HC RX 637 (ALT 250 FOR IP): Performed by: PSYCHIATRY & NEUROLOGY

## 2022-03-13 PROCEDURE — 99232 SBSQ HOSP IP/OBS MODERATE 35: CPT | Performed by: INTERNAL MEDICINE

## 2022-03-13 PROCEDURE — 99223 1ST HOSP IP/OBS HIGH 75: CPT | Performed by: PSYCHIATRY & NEUROLOGY

## 2022-03-13 PROCEDURE — 6370000000 HC RX 637 (ALT 250 FOR IP): Performed by: INTERNAL MEDICINE

## 2022-03-13 PROCEDURE — 1240000000 HC EMOTIONAL WELLNESS R&B

## 2022-03-13 RX ORDER — LORAZEPAM 1 MG/1
4 TABLET ORAL
Status: DISCONTINUED | OUTPATIENT
Start: 2022-03-13 | End: 2022-03-15 | Stop reason: HOSPADM

## 2022-03-13 RX ORDER — LORAZEPAM 1 MG/1
2 TABLET ORAL
Status: DISCONTINUED | OUTPATIENT
Start: 2022-03-13 | End: 2022-03-15 | Stop reason: HOSPADM

## 2022-03-13 RX ORDER — LORAZEPAM 2 MG/ML
2 INJECTION INTRAMUSCULAR
Status: DISCONTINUED | OUTPATIENT
Start: 2022-03-13 | End: 2022-03-15 | Stop reason: HOSPADM

## 2022-03-13 RX ORDER — LORAZEPAM 1 MG/1
1 TABLET ORAL
Status: DISCONTINUED | OUTPATIENT
Start: 2022-03-13 | End: 2022-03-15 | Stop reason: HOSPADM

## 2022-03-13 RX ORDER — LORAZEPAM 1 MG/1
3 TABLET ORAL
Status: DISCONTINUED | OUTPATIENT
Start: 2022-03-13 | End: 2022-03-15 | Stop reason: HOSPADM

## 2022-03-13 RX ORDER — LORAZEPAM 2 MG/ML
4 INJECTION INTRAMUSCULAR
Status: DISCONTINUED | OUTPATIENT
Start: 2022-03-13 | End: 2022-03-15 | Stop reason: HOSPADM

## 2022-03-13 RX ORDER — LORAZEPAM 2 MG/ML
1 INJECTION INTRAMUSCULAR
Status: DISCONTINUED | OUTPATIENT
Start: 2022-03-13 | End: 2022-03-15 | Stop reason: HOSPADM

## 2022-03-13 RX ORDER — ONDANSETRON 4 MG/1
4 TABLET, FILM COATED ORAL EVERY 8 HOURS PRN
Status: DISCONTINUED | OUTPATIENT
Start: 2022-03-13 | End: 2022-03-15 | Stop reason: HOSPADM

## 2022-03-13 RX ORDER — LORAZEPAM 2 MG/ML
3 INJECTION INTRAMUSCULAR
Status: DISCONTINUED | OUTPATIENT
Start: 2022-03-13 | End: 2022-03-15 | Stop reason: HOSPADM

## 2022-03-13 RX ORDER — GAUZE BANDAGE 2" X 2"
100 BANDAGE TOPICAL DAILY
Status: DISCONTINUED | OUTPATIENT
Start: 2022-03-13 | End: 2022-03-15 | Stop reason: HOSPADM

## 2022-03-13 RX ADMIN — HYDROXYZINE HYDROCHLORIDE 50 MG: 50 TABLET, FILM COATED ORAL at 22:10

## 2022-03-13 RX ADMIN — NICOTINE POLACRILEX 4 MG: 2 GUM, CHEWING BUCCAL at 08:59

## 2022-03-13 RX ADMIN — NICOTINE POLACRILEX 4 MG: 2 GUM, CHEWING BUCCAL at 20:30

## 2022-03-13 RX ADMIN — PANTOPRAZOLE SODIUM 40 MG: 40 TABLET, DELAYED RELEASE ORAL at 08:52

## 2022-03-13 RX ADMIN — CHLORDIAZEPOXIDE HYDROCHLORIDE 10 MG: 10 CAPSULE ORAL at 20:30

## 2022-03-13 RX ADMIN — THIAMINE HCL TAB 100 MG 100 MG: 100 TAB at 14:45

## 2022-03-13 RX ADMIN — CHLORDIAZEPOXIDE HYDROCHLORIDE 10 MG: 10 CAPSULE ORAL at 14:45

## 2022-03-13 RX ADMIN — MULTIPLE VITAMINS W/ MINERALS TAB 1 TABLET: TAB at 08:53

## 2022-03-13 RX ADMIN — TRAZODONE HYDROCHLORIDE 50 MG: 50 TABLET ORAL at 22:10

## 2022-03-13 RX ADMIN — CHLORDIAZEPOXIDE HYDROCHLORIDE 10 MG: 10 CAPSULE ORAL at 08:53

## 2022-03-13 RX ADMIN — SERTRALINE HYDROCHLORIDE 25 MG: 25 TABLET ORAL at 08:52

## 2022-03-13 RX ADMIN — CARVEDILOL 3.12 MG: 3.12 TABLET, FILM COATED ORAL at 16:52

## 2022-03-13 RX ADMIN — HYDROXYZINE HYDROCHLORIDE 50 MG: 50 TABLET, FILM COATED ORAL at 13:50

## 2022-03-13 RX ADMIN — CARVEDILOL 3.12 MG: 3.12 TABLET, FILM COATED ORAL at 08:52

## 2022-03-13 RX ADMIN — NICOTINE POLACRILEX 4 MG: 2 GUM, CHEWING BUCCAL at 16:52

## 2022-03-13 NOTE — PLAN OF CARE
Problem: Altered Mood, Depressive Behavior:  Goal: Able to verbalize and/or display a decrease in depressive symptoms  Description: Able to verbalize and/or display a decrease in depressive symptoms  Outcome: Ongoing   Continues to endorse depressive symptoms related to precipitating factors that led to increase in ETOH consumption.   Problem: Altered Mood, Depressive Behavior:  Goal: Ability to disclose and discuss suicidal ideas will improve  Description: Ability to disclose and discuss suicidal ideas will improve  Outcome: Ongoing   Denies suicidal thoughts, feels safe while in hospital  Problem: Altered Mood, Depressive Behavior:  Goal: Able to verbalize support systems  Description: Able to verbalize support systems  Outcome: Ongoing   Relates he has a good support system  Problem: Substance Abuse:  Goal: Absence of drug withdrawal signs and symptoms  Description: Absence of drug withdrawal signs and symptoms  Outcome: Ongoing   Complains of \"body feeling off, but better\" relates it is similar to other times he has experienced ETOH withdrawal  Problem: Tobacco Use:  Goal: Inpatient tobacco use cessation counseling participation  Description: Inpatient tobacco use cessation counseling participation  Outcome: Ongoing   declines

## 2022-03-13 NOTE — PLAN OF CARE
Problem: Substance Abuse:  Goal: Absence of drug withdrawal signs and symptoms  Description: Absence of drug withdrawal signs and symptoms  Outcome: Ongoing   Patient reports minimal withdrawal symptoms   Problem: Tobacco Use:  Goal: Inpatient tobacco use cessation counseling participation  Description: Inpatient tobacco use cessation counseling participation  Outcome: Ongoing   Patient uses Nicoderm patch to control tobacco cravings   Problem: Altered Mood, Depressive Behavior:  Goal: Able to verbalize and/or display a decrease in depressive symptoms  Description: Able to verbalize and/or display a decrease in depressive symptoms  Outcome: Ongoing  Mr Sudhakar Wilkes is seen in the dayroom, his affect is flat. He reports anxiety and depression, lack of motivation and feeling  fatigued. Fleeting suicidal thoughts, agrees to be safe on the unit. Wants to get help with Alcohol abuse. Takes medications, cooperative with care.  15 minute safety checks continue
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
## 2022-03-13 NOTE — H&P
Department of Psychiatry  H&P    CHIEF COMPLAINT: Suicidal ideation  Depression and suicidal ideation  History obtained from:  patient, electronic medical record    Patient was seen after discussing with the treatment team and reviewing the chart. CIRCUMSTANCES OF ADMISSION: The patient was transferred from medical floor where he had initially been admitted for suicidal ideation. HISTORY OF PRESENT ILLNESS:      The patient is a 32 y.o. male with significant past history of depression and alcoholism. The patient has been seen by the psychiatry consultant. Admitted that he has been drinking alcohol leading up to his admission and has multiple losses including his job and car. He has also broken up with his girlfriend  The patient was seen at bedside. He told me that he is feeling overwhelmed. He has been depressed constantly. He also has suicidal ideation. The patient says he recognizes the impact of drinking on his mental health. He has been drinking about 1/5 of liquor every day. He can drink 15-20 shots a day. The patient states he experiences auditory hallucinations usually when he is withdrawing from alcohol but does not normally have any auditory or visual hallucination or psychotic symptoms. The patient recounts a number of stressful life events which have been contributing to his depressive symptoms as noted above. The patient reports depressed mood, anhedonia, hopelessness, helplessness, worthlessness, fatigue and lack of motivation. He has intermittent suicidal ideation and believes that he\" himself. He also has high blood anxiety which is generalized and usually always present. He denies any history of panic attack. The patient was pleasant on approach. He is alert and oriented. He is denying any psychotic symptoms. He denies any paranoia. The patient has some difficulty in sleeping and his appetite is poor.     Stressors: As noted above    The patient is currently receiving care for the above psychiatric illness. Medications Prior to Admission:   Medications Prior to Admission: escitalopram (LEXAPRO) 10 MG tablet, Take 10 mg by mouth daily  Multiple Vitamins-Minerals (THERAPEUTIC MULTIVITAMIN-MINERALS) tablet, Take 1 tablet by mouth daily  omeprazole (PRILOSEC) 20 MG delayed release capsule, Take 40 mg by mouth Daily  ibuprofen (ADVIL;MOTRIN) 800 MG tablet, Take 1 tablet by mouth every 8 hours as needed for Pain  sertraline (ZOLOFT) 25 MG tablet, Take 1 tablet by mouth daily    Compliance: Fair    Lifetime Psychiatric Review of Systems       Depression: Depressed mood, anhedonia, hopelessness     Nirali or Hypomania:  no     Panic Attacks:  no     Phobias:  no     Obsessions and Compulsions:  no     PTSD : Denies     Hallucinations:  yes -as noted above     delusions:  no    Substance Abuse History:  ETOH: The patient started drinking at the age of 21. He had a 3-month sobriety duration in the remote past.  None alcohol level was 302. Marijuana: The patient admits to marijuana use. Review tox was positive for marijuana  Opiates: Denies   other Drugs: The patient has tried cocaine in the past.      Past Psychiatric History:  The patient has been treated at [facility] for substance use disorder. He has attempted suicide once before by taking an overdose. He has been admitted to psychiatry twice before.   He has been treated with Seroquel and Wellbutrin in the past.    Past Medical History:        Diagnosis Date    Moderate episode of recurrent major depressive disorder ([facility] [address] 12/8/2016       Past Surgical History:        Procedure Laterality Date    TYMPANOSTOMY TUBE PLACEMENT Bilateral 1996    WRIST FRACTURE SURGERY Right 2011       Allergies:   Grass pollen(k-o-r-t-swt ashutosh)    Family History:   Family History   Problem Relation Age of Onset    Depression Mother     Depression Sister     Depression Maternal Aunt     Heart Attack Neg Hx     Stroke Neg Hx     Cancer Neg Hx     Diabetes Neg Hx          Social History:    Social History     Socioeconomic History    Marital status: Single     Spouse name: none    Number of children: 0    Years of education: <12    Highest education level: None   Occupational History    Occupation: vanna     Employer: [employer]   Tobacco Use    Smoking status: Current Every Day Smoker     Packs/day: 0.50     Types: E-Cigarettes    Smokeless tobacco: Never Used   Substance and Sexual Activity    Alcohol use: Yes     Alcohol/week: 15.0 standard drinks     Types: 15 Shots of liquor per week     Comment: 15 shots of whiskey/vodka a day    Drug use: Yes     Types: Marijuana (Weed)     Comment: daily    Sexual activity: Never   Other Topics Concern    None   Social History Narrative    Diet - pretty gooe    Caffeine intake per day - 89- cups per day    Exercise pattern - none    Living situation - apt lori     Social Determinants of Health     Financial Resource Strain:     Difficulty of Paying Living Expenses: Not on file   Food Insecurity:     Worried About Running Out of Food in the Last Year: Not on file    Anita of Food in the Last Year: Not on file   Transportation Needs:     Lack of Transportation (Medical): Not on file    Lack of Transportation (Non-Medical):  Not on file   Physical Activity:     Days of Exercise per Week: Not on file    Minutes of Exercise per Session: Not on file   Stress:     Feeling of Stress : Not on file   Social Connections:     Frequency of Communication with Friends and Family: Not on file    Frequency of Social Gatherings with Friends and Family: Not on file    Attends Protestant Services: Not on file    Active Member of Clubs or Organizations: Not on file    Attends Club or Organization Meetings: Not on file    Marital Status: Not on file   Intimate Partner Violence:     Fear of Current or Ex-Partner: Not on file    Emotionally Abused: Not on file    Physically Abused: Not on file    Sexually Abused: Not on file   Housing Stability:     Unable to Pay for Housing in the Last Year: Not on file    Number of Places Lived in the Last Year: Not on file    Unstable Housing in the Last Year: Not on file           EXAMINATION:    PHYSICAL EXAM:  Vitals:  /61   Pulse 67   Temp 97.1 °F (36.2 °C) (Temporal)   Resp 14   Ht 6' (1.829 m)   Wt 228 lb (103.4 kg)   SpO2 98%   BMI 30.92 kg/m²      Review of Systems   Constitutional: Negative for chills and weight loss. HENT: Negative for ear pain and nosebleeds. Eyes: Negative for blurred vision and photophobia. Respiratory: Negative for cough, shortness of breath and wheezing. Cardiovascular: Negative for chest pain and palpitations. Gastrointestinal: Negative for abdominal pain, diarrhea and vomiting. Genitourinary: Negative for dysuria and urgency. Musculoskeletal: Negative for falls and joint pain. Skin: Negative for itching and rash. Neurological: Negative for tremors, seizures and weakness. Endo/Heme/Allergies: Does not bruise/bleed easily. Physical Exam:      Constitutional:  Appears well-developed and well-nourished, no acute distress  HENT:   Head: Normocephalic and atraumatic. Eyes: Conjunctivae are normal. Right eye exhibits no discharge. Left eye exhibits no discharge. No scleral icterus. Neck: Normal range of motion. Neck supple. Pulmonary/Chest:  No respiratory distress or accessory muscle use, no wheezing. Abdominal: Soft. Exhibits no distension. Musculoskeletal: Normal range of motion. Exhibits no edema. Skin: Skin is warm and dry. Patient is not diaphoretic. No erythema.     Neurologic Exam:   Muscle Strength & Tone: normal  CN 2-12-    II: Pupils equal and reactive,     III, IV, VI: EOM intact, no gaze preference or deviation    V: normal    VII: no facial asymmetry    VIII: normal hearing to speech  CN IX, X: Phonation is normal.  CN XI: Head turning and shoulder shrug are intact  CN XII: Tongue is midline with normal movements and no atrophy. Gait: normal gait   Involuntary Movements: No          Mental Status Examination:    Level of consciousness:  within normal limits   Appearance:  hospital attire  Behavior/Motor:  no abnormalities noted  Attitude toward examiner:  cooperative  Speech:  spontaneous, normal rate and normal volume   Mood: anxious and depressed  Affect:  mood congruent  Thought processes:  coherent   Thought content:  Suicidal Ideation:  active  Delusions:  no evidence of delusions  Perceptual Disturbance:  denies any perceptual disturbance  Cognition:  oriented to person, place, and time   Concentration intact  Memory intact  Insight fair   Judgement fair   Fund of Knowledge adequate          DIAGNOSIS:    MDD recurrent severe  Alcohol dependence      RISK ASSESSMENT:    SUICIDE RISK ASSESSMENT: Moderate  HOMICIDE: low  AGITATION/VIOLENCE: low  ELOPEMENT: low    LABS: REVIEWED TODAY:  No results for input(s): WBC, HGB, PLT in the last 72 hours. No results for input(s): NA, K, CL, CO2, BUN, CREATININE, GLUCOSE in the last 72 hours. No results for input(s): BILITOT, ALKPHOS, AST, ALT in the last 72 hours. No results found for: Francyne Jesus Manuel, LABBENZ, CANNAB, COCAINESCRN, LABMETH, OPIATESCREENURINE, PHENCYCLIDINESCREENURINE, PPXUR, ETOH  No results found for: TSH, FREET4  No results found for: LITHIUM  No results found for: VALPROATE, CBMZ  No results found for: LITHIUM, VALPROATE    FURTHER LABS ORDERED :               TREATMENT PLAN:    Risk Management:  routine:  no special precautions necessary    Collateral Information:  Will obtain collateral information from the family or friends. Will obtain medical records as appropriate from out patient providers  Will consult the hospitalist for a physical exam to rule out any co-morbid physical condition.     Home medication Reconciled       New Medications started during this admission :    Zoloft 25 mg daily  Prn Haldol 5mg and Vistaril 50mg q6hr for extreme agitation. Trazodone as ordered for insomnia  Vistaril as ordered for anxiety  Discussed with the patient risk, benefit, alternative and common side effects for the  proposed medication treatment. Patient is consenting to the treatment. Psychotherapy:   Encourage participation in milieu and group therapy  Individual therapy as needed        Behavioral Services  Medicare Certification      Admission Day 1  I certify that this patient's inpatient psychiatric hospital admission is medically necessary for:     (1) treatment which could reasonably be expected to improve this patient's condition, or     (2) diagnostic study or its equivalent. Matthew Figueroa is a 32 y.o. male being evaluated face to face    --Joseph Suazo MD on 3/13/2022 at 2:25 PM    An electronic signature was used to authenticate this note. **This report has been created using voice recognition software. It may contain minor errors which are inherent in voice recognition technology. **

## 2022-03-13 NOTE — GROUP NOTE
Group Therapy Note    Date: 3/12/2022    Group Start Time: 2030  Group End Time: 2050  Group Topic: Group Therapy    [facility]    Lexy Holcomb RN        Group Therapy Note    Attendees: 10/16                 Status After Intervention:  Improved    Participation Level:  Active Listener    Participation Quality: Appropriate      Speech:  normal      Thought Process/Content: Logical      Affective Functioning: Congruent      Mood: euphoric      Level of consciousness:  Alert      Response to Learning: Able to verbalize current knowledge/experience      Endings: None Reported    Modes of Intervention: Education      Discipline Responsible: Be[employer]      Signature:  Lexy Holcomb RN

## 2022-03-13 NOTE — PLAN OF CARE
[facility]  Initial Interdisciplinary Treatment Plan NO      Original treatment plan Date & Time: 3/13/2022   1049    Admission Type:  Admission Type: Voluntary    Reason for admission:   Reason for Admission: suicidal ideations/alcohol abuse    Estimated Length of Stay:  5-7days  Estimated Discharge Date: to be determined by physician    PATIENT STRENGTHS:  Patient Strengths:Strengths: Communication,Motivated,Positive Support,Social Skills  Patient Strengths and Limitations:Limitations: Inappropriate/potentially harmful leisure interests,Difficult relationships / poor social skills  Addictive Behavior: Addictive Behavior  In the past 3 months, have you felt or has someone told you that you have a problem with:  : None  Do you have a history of Chemical Use?: No  Do you have a history of Alcohol Use?: Yes  Do you have a history of Street Drug Abuse?: No  Histroy of Prescripton Drug Abuse?: No  Medical Problems:  Past Medical History:   Diagnosis Date    Moderate episode of recurrent major depressive disorder ([facility] [address] 12/8/2016     Status EXAM:Status and Exam  Normal: No  Facial Expression: Flat  Affect: Congruent  Level of Consciousness: Alert  Mood:Normal: No  Mood: Depressed,Anxious  Motor Activity:Normal: Yes  Motor Activity: Decreased  Interview Behavior: Cooperative  Preception: [place] to Person,[place] to Time,[place] to Place,[place] to Situation  Attention:Normal: Yes  Attention: Distractible  Thought Processes: Circumstantial  Thought Content:Normal: Yes  Hallucinations: None  Delusions: No  Memory:Normal: Yes  Insight and Judgment: Yes  Insight and Judgment: Poor Insight,Poor Judgment  Present Suicidal Ideation: No  Present Homicidal Ideation: No    EDUCATION:   Learner Progress Toward Treatment Goals: reviewed group plans and strategies for care    Method:group therapy, medication compliance, individualized assessments and care planning    Outcome: needs reinforcement    PATIENT GOALS: to be discussed with patient within 72 hours    PLAN/TREATMENT RECOMMENDATIONS:     continue group therapy , medications compliance, goal setting, individualized assessments and care, continue to monitor pt on unit      SHORT-TERM GOALS:   Time frame for Short-Term Goals: 5-7 days    LONG-TERM GOALS:  Time frame for Long-Term Goals: 6 months  Members Present in Team Meeting: See Signature Sheet    Sloan Buenrostro

## 2022-03-14 PROCEDURE — 6370000000 HC RX 637 (ALT 250 FOR IP): Performed by: PSYCHIATRY & NEUROLOGY

## 2022-03-14 PROCEDURE — 6370000000 HC RX 637 (ALT 250 FOR IP): Performed by: INTERNAL MEDICINE

## 2022-03-14 PROCEDURE — 99232 SBSQ HOSP IP/OBS MODERATE 35: CPT | Performed by: INTERNAL MEDICINE

## 2022-03-14 PROCEDURE — 99232 SBSQ HOSP IP/OBS MODERATE 35: CPT | Performed by: PSYCHIATRY & NEUROLOGY

## 2022-03-14 PROCEDURE — APPSS30 APP SPLIT SHARED TIME 16-30 MINUTES: Performed by: NURSE PRACTITIONER

## 2022-03-14 PROCEDURE — 1240000000 HC EMOTIONAL WELLNESS R&B

## 2022-03-14 RX ADMIN — CHLORDIAZEPOXIDE HYDROCHLORIDE 10 MG: 10 CAPSULE ORAL at 08:36

## 2022-03-14 RX ADMIN — CARVEDILOL 3.12 MG: 3.12 TABLET, FILM COATED ORAL at 08:36

## 2022-03-14 RX ADMIN — SERTRALINE HYDROCHLORIDE 25 MG: 25 TABLET ORAL at 08:37

## 2022-03-14 RX ADMIN — NICOTINE POLACRILEX 4 MG: 2 GUM, CHEWING BUCCAL at 11:00

## 2022-03-14 RX ADMIN — CHLORDIAZEPOXIDE HYDROCHLORIDE 10 MG: 10 CAPSULE ORAL at 21:14

## 2022-03-14 RX ADMIN — LORAZEPAM 1 MG: 1 TABLET ORAL at 00:04

## 2022-03-14 RX ADMIN — NICOTINE POLACRILEX 4 MG: 2 GUM, CHEWING BUCCAL at 13:58

## 2022-03-14 RX ADMIN — CLONIDINE HYDROCHLORIDE 0.1 MG: 0.1 TABLET ORAL at 00:04

## 2022-03-14 RX ADMIN — NICOTINE POLACRILEX 4 MG: 2 GUM, CHEWING BUCCAL at 16:50

## 2022-03-14 RX ADMIN — HYDROXYZINE HYDROCHLORIDE 50 MG: 50 TABLET, FILM COATED ORAL at 23:02

## 2022-03-14 RX ADMIN — CHLORDIAZEPOXIDE HYDROCHLORIDE 10 MG: 10 CAPSULE ORAL at 15:37

## 2022-03-14 RX ADMIN — PANTOPRAZOLE SODIUM 40 MG: 40 TABLET, DELAYED RELEASE ORAL at 08:37

## 2022-03-14 RX ADMIN — MULTIPLE VITAMINS W/ MINERALS TAB 1 TABLET: TAB at 08:36

## 2022-03-14 RX ADMIN — TRAZODONE HYDROCHLORIDE 50 MG: 50 TABLET ORAL at 23:02

## 2022-03-14 RX ADMIN — CARVEDILOL 3.12 MG: 3.12 TABLET, FILM COATED ORAL at 17:45

## 2022-03-14 RX ADMIN — THIAMINE HCL TAB 100 MG 100 MG: 100 TAB at 08:36

## 2022-03-14 RX ADMIN — NICOTINE POLACRILEX 4 MG: 2 GUM, CHEWING BUCCAL at 19:47

## 2022-03-14 NOTE — PROGRESS NOTES
[facility] Internal Medicine  David James MD; Janna Chávez MD; Omkar Heller MD; MD Codi Hammond MD; Saint Goodwill, MD [facility] Internal Medicine   [address] / HISTORY AND PHYSICAL EXAMINATION            Date:   3/13/2022  Patient name:  Alyse Casillas  Date of admission:  3/12/2022  2:38 PM  MRN:   [id number]  Account:  [de-identified]  YOB: 1995  PCP:    No primary care provider on file. Room:   [address] [place] [address]  Code Status:    Full Code    Physician Requesting Consult: Donnell Li MD    Reason for Consult: Medical management    Chief Complaint:     No chief complaint on file. Suicidal ideations    History Obtained From:     patient    History of Present Illness:     31-year-old gentleman with underlying history of obesity, alcohol abuse, chronic labs showing low potassium, abnormal liver function test, high alcohol blood level, tachycardia on vitals, admitted to inpatient psych cellulitis    Past Medical History:     Past Medical History:   Diagnosis Date    Moderate episode of recurrent major depressive disorder ([facility] [address] 12/8/2016        Past Surgical History:     Past Surgical History:   Procedure Laterality Date    TYMPANOSTOMY TUBE PLACEMENT Bilateral 1996    WRIST FRACTURE SURGERY Right 2011        Medications Prior to Admission:     Prior to Admission medications    Medication Sig Start Date End Date Taking?  Authorizing Provider   escitalopram (LEXAPRO) 10 MG tablet Take 10 mg by mouth daily    Historical Provider, MD   Multiple Vitamins-Minerals (THERAPEUTIC MULTIVITAMIN-MINERALS) tablet Take 1 tablet by mouth daily 3/11/22   Codi Charlton MD   omeprazole (PRILOSEC) 20 MG delayed release capsule Take 40 mg by mouth Daily    Historical Provider, MD   ibuprofen (ADVIL;MOTRIN) 800 MG tablet Take 1 tablet by mouth every 8 hours as needed for Pain 5/21/17   Karis Mason MD   sertraline (ZOLOFT) 25 MG tablet Take 1 tablet by mouth daily 3/13/17   Morgan Kinney MD        Allergies:     Grass pollen(k-o-r-t-swt ashutosh)    Social History:     Tobacco:    reports that he has been smoking e-cigarettes. He has been smoking about 0.50 packs per day. He has never used smokeless tobacco.  Alcohol:      reports current alcohol use of about 15.0 standard drinks of alcohol per week. Drug Use:  reports current drug use. Drug: Marijuana Veldon Bunting). Family History:     Family History   Problem Relation Age of Onset    Depression Mother     Depression Sister     Depression Maternal Aunt     Heart Attack Neg Hx     Stroke Neg Hx     Cancer Neg Hx     Diabetes Neg Hx        Review of Systems:     Positive and Negative as described in HPI. CONSTITUTIONAL:  negative for fevers, chills, sweats, fatigue, weight loss  HEENT:  negative for vision, hearing changes, runny nose, throat pain  RESPIRATORY:  negative for shortness of breath, cough, congestion, wheezing. CARDIOVASCULAR:  negative for chest pain, palpitations.   GASTROINTESTINAL:  negative for nausea, vomiting, diarrhea, constipation, change in bowel habits, abdominal pain   GENITOURINARY:  negative for difficulty of urination, burning with urination, frequency   INTEGUMENT:  negative for rash, skin lesions, easy bruising   HEMATOLOGIC/LYMPHATIC:  negative for swelling/edema   ALLERGIC/IMMUNOLOGIC:  negative for urticaria , itching  ENDOCRINE:  negative increase in drinking, increase in urination, hot or cold intolerance  MUSCULOSKELETAL:  negative joint pains, muscle aches, swelling of joints  NEUROLOGICAL:  negative for headaches, dizziness, lightheadedness, numbness, pain, tingling extremities  BEHAVIOR/PSYC    Physical Exam:     /76   Pulse 71   Temp 97.7 °F (36.5 °C) (Temporal)   Resp 14   Ht 6' (1.829 m)   Wt 228 lb (103.4 kg)   SpO2 98%   BMI 30.92 kg/m²   Temp (24hrs), Av.4 °F (36.3 °C), Min:97.1 °F (36.2 °C), Max:97.7 °F (36.5 °C)    No results for input(s): POCGLU in the last 72 hours. No intake or output data in the 24 hours ending 03/13/22 2302    General Appearance:  alert, well appearing, and in no acute distress  Mental status: oriented to person, place, and time with normal affect  Head:  normocephalic, atraumatic. Eye: no icterus, redness, pupils equal and reactive, extraocular eye movements intact, conjunctiva clear  Ear: normal external ear, no discharge, hearing intact  Nose:  no drainage noted  Mouth: mucous membranes moist  Neck: supple, no carotid bruits, thyroid not palpable  Lungs: Bilateral equal air entry, clear to ausculation, no wheezing, rales or rhonchi, normal effort  Cardiovascular: normal rate, regular rhythm, no murmur, gallop, rub. Abdomen: Soft, nontender, nondistended, normal bowel sounds, no hepatomegaly or splenomegaly  Neurologic: There are no new focal motor or sensory deficits, normal muscle tone and bulk, no abnormal sensation, normal speech, cranial nerves II through XII grossly intact  Skin: No gross lesions, rashes, bruising or bleeding on exposed skin area  Extremities:  peripheral pulses palpable, no pedal edema or calf pain with palpation  Psych: normal affect    Investigations:      Laboratory Testing:  No results found for this or any previous visit (from the past 24 hour(s)). Imaging/Diagonstics:  No results found.     Assessment :      Hospital Problems           Last Modified POA    Hx of substance abuse ([facility] [address] (Chronic) 3/12/2022 Yes    Abnormal liver function 3/12/2022 Yes    Alcohol dependence with withdrawal ([address] 3/13/2022 Yes    Class 1 obesity due to excess calories without serious comorbidity with body mass index (BMI) of 30.0 to 30.9 in adult 3/12/2022 Yes    Hypokalemia 3/12/2022 Yes    Hypomagnesemia 3/12/2022 Yes    Tachycardia 3/12/2022 Yes    MDD (major depressive disorder), recurrent severe, without psychosis ([address] 3/13/2022 Yes          Plan:     3 80-year-old gentleman with underlying history of alcoholism, admitted to inpatient psych for suicidal ideations,  2. Alcohol intoxication, monitor for withdrawal,  3. Hypokalemia, replace,  4. Tachycardia, possible alcohol-related withdrawal, started on Coreg,  5. Hypomagnesemia, recheck the levels and replace,  6. Obesity, low-calorie diet,  7. Full CODE STATUS    3/13  Labs reviewed   Tachycardia better       Consultations:   IP CONSULT TO INTERNAL MEDICINE  IP CONSULT TO SOCIAL WORK  IP CONSULT TO SOCIAL WORK      Jelly Garcia MD  3/13/2022  11:02 PM    Copy sent to Dr. aBrbara Connelly primary care provider on file. Please note that this chart was generated using voice recognition Dragon dictation software. Although every effort was made to ensure the accuracy of this automated transcription, some errors in transcription may have occurred.

## 2022-03-14 NOTE — PROGRESS NOTES
Daily Progress Note  3/14/2022    Patient Name: Preeti Delacruz    CHIEF COMPLAINT: Depression with suicidal ideation         Emergency Medications: Required 1 mg p.o. Ativan around midnight previous evening for alcohol withdrawal.  CIWA score of 9. Scheduled medications: Adherent    SUBJECTIVE:     Patient seen face-to-face for follow-up assessment. He is medication compliant and denying any side effects to his regimen at this time. He was started on sertraline 25 mg to help with mood. Also receiving Librium 10 mg 3 times daily for alcohol withdrawal complications. Patient COWS score 9 around midnight the previous evening, but reports improvement in withdrawal symptoms at time of assessment today. No pupillary dilation observed. Vitals within appropriate range. Denies tactile disturbances, clouded sensorium, or nausea. No tremor observed. Patient does endorse anxiety and rates his current anxiety as 6 out of 10, with 10 indicating the most severe. Does endorse feeling restless. Observed pacing in the milieu. Reports that his mood is modestly improving, though is unable to contract for safety off unit. Reports fleeting suicidal thoughts. Fears that he will relapse and use alcohol which will lead to him wanting to end his life. 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 Did express concern at this lifestyle may lead to relapse. Patient is minimizing. He reports desire to be linked with AA as opposed to intensive outpatient programming or inpatient AOD. Patient states he had difficulty sleeping overnight. Feels tired and fatigued today, though his thoughts are racing and he is unable to relax. Patient requires continued inpatient hospitalization for safety as he has yet to demonstrate stability.     Appetite:  [x] Normal/Adequate/Unchanged  [] Increased [] Decreased      Sleep:       [] Normal/Adequate/Unchanged  [] Fair  [x] Poor      Group Attendance on Unit:   [] Yes  [] Selectively    [x] No    Medication Side Effects: Denies         Mental Status Exam  Level of consciousness: Alert and awake   Appearance: Appropriate attire for setting, standing, with fair  grooming and hygiene, appears disheveled  Behavior/Motor: Approachable, restless and pacing  Attitude toward examiner: Cooperative, attentive, good eye contact, evasive and minimizing  Speech: spontaneous, normal rate, normal volume and well articulated   Mood: Reports \"anxious\"  Affect: Mood congruent  Thought processes: linear and goal directed   Thought content: Denies homicidal ideation  Suicidal Ideation: Reports fleeting suicidal ideation, unable to contract for safety off unit  Delusions: Denies  Perceptual Disturbance: Denies. Does not appear to be responding to internal stimuli  Cognition: Oriented to self, location, time, and situation  Memory: intact  Insight & Judgement: poor     Data   height is 6' (1.829 m) and weight is 228 lb (103.4 kg). His oral temperature is 97.5 °F (36.4 °C). His blood pressure is 122/80 and his pulse is 68. His respiration is 16 and oxygen saturation is 98%. Labs:   No visits with results within 2 Day(s) from this visit.    Latest known visit with results is:   Admission on 03/09/2022, Discharged on 03/12/2022   Component Date Value Ref Range Status    WBC 03/09/2022 5.3  3.5 - 11.0 k/uL Final    RBC 03/09/2022 5.70  4.5 - 5.9 m/uL Final    Hemoglobin 03/09/2022 17.8* 13.5 - 17.5 g/dL Final    Hematocrit 03/09/2022 51.3  41 - 53 % Final    MCV 03/09/2022 90.0  80 - 100 fL Final    MCH 03/09/2022 31.3  26 - 34 pg Final    MCHC 03/09/2022 34.8  31 - 37 g/dL Final    RDW 03/09/2022 14.4  11.5 - 14.9 % Final    Platelets 18/78/0712 209  150 - 450 k/uL Final    MPV 03/09/2022 7.6  6.0 - 12.0 fL Final    Seg Neutrophils 03/09/2022 52  36 - 66 % Final    Lymphocytes 03/09/2022 39  24 - 44 % Final    Monocytes 03/09/2022 7  1 - 7 % Final    Eosinophils % 03/09/2022 1  0 - 4 % Final    Basophils 03/09/2022 1  0 - 2 % Final    Segs Absolute 03/09/2022 2.80  1.3 - 9.1 k/uL Final    Absolute Lymph # 03/09/2022 2.10  1.0 - 4.8 k/uL Final    Absolute Mono # 03/09/2022 0.40  0.1 - 1.3 k/uL Final    Absolute Eos # 03/09/2022 0.00  0.0 - 0.4 k/uL Final    Basophils Absolute 03/09/2022 0.00  0.0 - 0.2 k/uL Final    Glucose 03/09/2022 106* 70 - 99 mg/dL Final    BUN 03/09/2022 6  6 - 20 mg/dL Final    CREATININE 03/09/2022 0.77  0.70 - 1.20 mg/dL Final    Calcium 03/09/2022 8.9  8.6 - 10.4 mg/dL Final    Sodium 03/09/2022 139  135 - 144 mmol/L Final    Potassium 03/09/2022 3.6* 3.7 - 5.3 mmol/L Final    Chloride 03/09/2022 98  98 - 107 mmol/L Final    CO2 03/09/2022 22  20 - 31 mmol/L Final    Anion Gap 03/09/2022 19* 9 - 17 mmol/L Final    Alkaline Phosphatase 03/09/2022 87  40 - 129 U/L Final    ALT 03/09/2022 133* 5 - 41 U/L Final    AST 03/09/2022 150* <40 U/L Final    Total Bilirubin 03/09/2022 0.54  0.3 - 1.2 mg/dL Final    Total Protein 03/09/2022 7.8  6.4 - 8.3 g/dL Final    Albumin 03/09/2022 4.6  3.5 - 5.2 g/dL Final    GFR Non- 03/09/2022 >60  >60 mL/min Final    GFR  03/09/2022 >60  >60 mL/min Final    GFR Comment 03/09/2022        Final    Comment: Average GFR for 20-28 years old:   80 mL/min/1.73sq m  Chronic Kidney Disease:   <60 mL/min/1.73sq m  Kidney failure:   <15 mL/min/1.73sq m              eGFR calculated using average adult body mass. Additional eGFR calculator available at:        [employer]            Lipase 03/09/2022 45  13 - 60 U/L Final    Ethanol 03/09/2022 302* <10 mg/dL Final    Ethanol percent 03/09/2022 0.302  % Final    Magnesium 03/09/2022 2.0  1.6 - 2.6 mg/dL Final    Specimen Description 03/11/2022 . NASOPHARYNGEAL SWAB   Final    SARS-CoV-2, Rapid 03/11/2022 Not Detected  Not Detected Final    Comment:       Rapid NAAT:  The specimen is NEGATIVE for SARS-CoV-2, the novel coronavirus associated with   COVID-19. The ID NOW COVID-19 assay is designed to detect the virus that causes COVID-19 in patients   with signs and symptoms of infection who are suspected of COVID-19. An individual without symptoms of COVID-19 and who is not shedding SARS-CoV-2 virus would   expect to have a negative (not detected) result in this assay. Negative results should be treated as presumptive and, if inconsistent with clinical signs   and symptoms or necessary for patient management,  should be tested with an alternative molecular assay. Negative results do not preclude   SARS-CoV-2 infection and   should not be used as the sole basis for patient management decisions. Fact sheet for Healthcare Providers: Matthew  Fact sheet for Patients: Matthew          Methodology: Isothermal Nucleic Acid Amplification           Reviewed patient's current plan of care and vital signs with nursing staff.     Labs reviewed: [x] Yes  Last EKG in EMR reviewed: [x] Yes    Medications  Current Facility-Administered Medications: ondansetron (ZOFRAN) tablet 4 mg, 4 mg, Oral, Q8H PRN  LORazepam (ATIVAN) tablet 1 mg, 1 mg, Oral, Q1H PRN **OR** LORazepam (ATIVAN) injection 1 mg, 1 mg, IntraMUSCular, Q1H PRN **OR** LORazepam (ATIVAN) tablet 2 mg, 2 mg, Oral, Q1H PRN **OR** LORazepam (ATIVAN) injection 2 mg, 2 mg, IntraMUSCular, Q1H PRN **OR** LORazepam (ATIVAN) tablet 3 mg, 3 mg, Oral, Q1H PRN **OR** LORazepam (ATIVAN) injection 3 mg, 3 mg, IntraMUSCular, Q1H PRN **OR** LORazepam (ATIVAN) tablet 4 mg, 4 mg, Oral, Q1H PRN **OR** LORazepam (ATIVAN) injection 4 mg, 4 mg, IntraMUSCular, Q1H PRN  thiamine mononitrate tablet 100 mg, 100 mg, Oral, Daily  acetaminophen (TYLENOL) tablet 650 mg, 650 mg, Oral, Q4H PRN  ibuprofen (ADVIL;MOTRIN) tablet 400 mg, 400 mg, Oral, Q6H PRN  aluminum & magnesium hydroxide-simethicone (MAALOX) 200-200-20 MG/5ML suspension 30 mL, 30 mL, Oral, Q6H PRN  traZODone (DESYREL) tablet 50 mg, 50 mg, Oral, Nightly PRN  polyethylene glycol (GLYCOLAX) packet 17 g, 17 g, Oral, Daily PRN  nicotine (NICODERM CQ) 14 MG/24HR 1 patch, 1 patch, TransDERmal, Daily  hydrOXYzine (ATARAX) tablet 50 mg, 50 mg, Oral, TID PRN  chlordiazePOXIDE (LIBRIUM) capsule 10 mg, 10 mg, Oral, TID  therapeutic multivitamin-minerals 1 tablet, 1 tablet, Oral, Daily  nicotine polacrilex (NICORETTE) gum 4 mg, 4 mg, Oral, Q1H PRN  pantoprazole (PROTONIX) tablet 40 mg, 40 mg, Oral, QAM AC  carvedilol (COREG) tablet 3.125 mg, 3.125 mg, Oral, BID WC  cloNIDine (CATAPRES) tablet 0.1 mg, 0.1 mg, Oral, BID PRN  sertraline (ZOLOFT) tablet 25 mg, 25 mg, Oral, Daily    ASSESSMENT  MDD (major depressive disorder), recurrent severe, without psychosis ([facility] [address]  Alcohol dependence with withdrawal       HANDOFF  Patient symptoms are:  Remain unstable but modestly improving  Medications as determined by attending physician     Discussed trialing naltrexone/Vivitrol shot but patient is not interested. Encourage participation in groups and milieu. Probable discharge is to be determined by MD      Electronically signed by SUSAN Whitehead CNP on 3/14/2022 at 3:38 PM    **This report has been created using voice recognition software. It may contain minor errors which are inherent in voice recognition technology. **  I independently saw and evaluated the patient. I reviewed the  documentation above. Any additional comments or changes to the midlevel provider's documentation are stated below otherwise agree with assessment. The patient states that he had some sweating yesterday but his withdrawal symptoms are better. He is denying suicidal ideation and hamida for safety. PLAN  Librium dc'd  Zoloft increased to 50mg daiy. Attempt to develop insight  Psycho-education conducted. Supportive Therapy conducted.   Probable discharge is 3-5 days  Follow-up daily while on inpatient unit    Electronically signed by Parminder Hou MD on 3/14/22 at 11:06 PM EDT

## 2022-03-14 NOTE — PLAN OF CARE
Problem: Altered Mood, Depressive Behavior:  Goal: Able to verbalize and/or display a decrease in depressive symptoms  Description: Able to verbalize and/or display a decrease in depressive symptoms  3/13/2022 2121 by Uyen Victoria LPN  Outcome: Ongoing  Note: Patient denies depression this shift. Patient admits to anxiety. Patient out in dayroom watching movies and being social with peers. Patient denies suicidal/homicidal ideations and hallucinations this shift. Patient educated and agrees to come to staff if this occurs. Patient monitored every 15 minutes with environmental safety checks. Problem: Altered Mood, Depressive Behavior:  Goal: Ability to disclose and discuss suicidal ideas will improve  Description: Ability to disclose and discuss suicidal ideas will improve  Outcome: Ongoing  Note:  Patient denies suicidal ideations at this time. Patient agreed to seek staff at anytime he felt like any urges to harm self would arise. Safety checks maintained [id number]. Problem: Altered Mood, Depressive Behavior:  Goal: Able to verbalize support systems  Description: Able to verbalize support systems  Outcome: Ongoing     Problem: Substance Abuse:  Goal: Absence of drug withdrawal signs and symptoms  Description: Absence of drug withdrawal signs and symptoms  3/13/2022 2121 by Uyen Victoria LPN  Outcome: Ongoing     Problem: Substance Abuse:  Goal: Participates in care planning  Description: Participates in care planning  Outcome: Ongoing  Note: Patient compliant with medications and assessment this shift.       Problem: Tobacco Use:  Goal: Inpatient tobacco use cessation counseling participation  Description: Inpatient tobacco use cessation counseling participation  3/13/2022 2121 by Uyen Victoria LPN  Outcome: Ongoing

## 2022-03-14 NOTE — PLAN OF CARE
Problem: Altered Mood, Depressive Behavior:  Goal: Ability to disclose and discuss suicidal ideas will improve  Description: Ability to disclose and discuss suicidal ideas will improve  3/14/2022 1810 by Nolvia Vivar LPN  Outcome: Ongoing  Note: Pt denies negative thoughts at this time. Pt denies hallucinations at this time. Encouraged to express feelings during 1:1 and as needed. Pt is med compliant. Pt attending groups offered on unit, and participated appropriately. Pt eating appropriately. Pt sleeping appropriately. Appropriate ADLs. Every 15 minute safety checks maintained. Problem: Substance Abuse:  Goal: Absence of drug withdrawal signs and symptoms  Description: Absence of drug withdrawal signs and symptoms  3/14/2022 1810 by Nolvia Vivar LPN  Outcome: Ongoing  Note: Patient reports feeling better, states withdrawal symptoms are minimal. Librium continued as ordered.

## 2022-03-14 NOTE — PLAN OF CARE
[facility]  Day 3 Interdisciplinary Treatment Plan NOTE    Review Date & Time: 3/14/2022           1321    Admission Type:   Admission Type: Voluntary    Reason for admission:  Reason for Admission: suicidal ideations/alcohol abuse  Estimated Length of Stay: 5-7 days  Estimated Discharge Date Update: to be determined by physician    PATIENT STRENGTHS:  Patient Strengths Strengths: Communication,Motivated,Positive Support,Social Skills  Patient Strengths and Limitations:Limitations: Unrealistic self-view,Inappropriate/potentially harmful leisure interests  Addictive Behavior:Addictive Behavior  In the past 3 months, have you felt or has someone told you that you have a problem with:  : None  Do you have a history of Chemical Use?: No  Do you have a history of Alcohol Use?: Yes  Do you have a history of Street Drug Abuse?: No  Histroy of Prescripton Drug Abuse?: No  Medical Problems:  Past Medical History:   Diagnosis Date    Moderate episode of [facility] 12/8/2016       Risk:  Fall RiskTotal: 79  Rogers Scale Rogers Scale Score: 22  BVC    Change in scores no Changes to plan of Care no    Status EXAM:   Status and Exam  Normal: Yes  Facial Expression: Brightened  Affect: Appropriate  Level of Consciousness: Alert  Mood:Normal: No  Mood: Anxious  Motor Activity:Normal: Yes  Motor Activity: Decreased  Interview Behavior: Cooperative  Preception: [place] to Person,[place] to Time,[place] to Place,[place] to Situation  Attention:Normal: Yes  Attention: Distractible  Thought Processes: Circumstantial  Thought Content:Normal: No  Thought Content: Preoccupations  Hallucinations: None  Delusions: No  Memory:Normal: Yes  Insight and Judgment: No  Insight and Judgment: Poor Judgment,Poor Insight  Present Suicidal Ideation: No  Present Homicidal Ideation: No    Daily Assessment Last Entry:   Daily Sleep (WDL): Within Defined Limits         Patient Currently in Pain: Denies  Daily Nutrition (WDL): Within Defined Limits    Patient Monitoring:  Frequency of Checks: 4 times per hour, close    Psychiatric Symptoms:   Depression Symptoms  Depression Symptoms: No problems reported or observed. Anxiety Symptoms  Anxiety Symptoms: Generalized  Nirali Symptoms  Nirali Symptoms: No problems reported or observed. Psychosis Symptoms  Delusion Type: No problems reported or observed. Suicide Risk CSSR-S:  1) Within the past month, have you wished you were dead or wished you could go to sleep and not wake up? : Yes  2) Have you actually had any thoughts of killing yourself? : Yes  3) Have you been thinking about how you might kill yourself? : Yes  5) Have you started to work out or worked out the details of how to kill yourself? Do you intend to carry out this plan? : No  6) Have you ever done anything, started to do anything, or prepared to do anything to end your life?: No  Change in Result                  no                 Change in Plan of care           no      EDUCATION:   EDUCATION:   Learner Progress Toward Treatment Goals: Reviewed results and recommendations of this team, Reviewed group plan and strategies, Reviewed signs, symptoms and risk of self harm and violent behavior, Reviewed goals and plan of care    Method:small group, individual verbal education    Outcome:verbalized by patient, but needs reinforcement to obtain goals    PATIENT GOALS:  Short term: \"Get better,go home\"  Long term: \"get an apartment\".     PLAN/TREATMENT RECOMMENDATIONS UPDATE: continue with group therapies, increased socialization, continue planning for after discharge goals, continue with medication compliance    SHORT-TERM GOALS UPDATE:   Time frame for Short-Term Goals: 5-7 days    LONG-TERM GOALS UPDATE:   Time frame for Long-Term Goals: 6 months  Members Present in Team Meeting: See Signature Sheet    Ellie Madrigal

## 2022-03-14 NOTE — PLAN OF CARE
Problem: Altered Mood, Depressive Behavior:  Goal: Ability to disclose and discuss suicidal ideas will improve  Description: Ability to disclose and discuss suicidal ideas will improve  3/14/2022 0817 by Noel Avila LPN  Note: No violent or negative behaviors noted at this time. Will cont to provide safe, calm, environment and use verbal de-escalation techniques when needed. Support and reassurance given as needed.

## 2022-03-14 NOTE — PROGRESS NOTES
[facility] Internal Medicine  Josias Quiroz MD; Keke Bagley MD; Kym Cole MD; MD Yvonne Flannery MD; Ananya Vera MD    [facility] Internal Medicine   [address] / HISTORY AND PHYSICAL EXAMINATION            Date:   3/14/2022  Patient name:  Leary Sicard  Date of admission:  3/12/2022  2:38 PM  MRN:   [id number]  Account:  [de-identified]  YOB: 1995  PCP:    No primary care provider on file. Room:   [address] [place] [address]  Code Status:    Full Code    Physician Requesting Consult: George Flores MD    Reason for Consult: Medical management    Chief Complaint:     No chief complaint on file. Suicidal ideations    History Obtained From:     patient    History of Present Illness:     59-year-old gentleman with underlying history of obesity, alcohol abuse, chronic labs showing low potassium, abnormal liver function test, high alcohol blood level, tachycardia on vitals, admitted to inpatient psych cellulitis    Past Medical History:     Past Medical History:   Diagnosis Date    Moderate episode of recurrent major depressive disorder ([facility] [address] 12/8/2016        Past Surgical History:     Past Surgical History:   Procedure Laterality Date    TYMPANOSTOMY TUBE PLACEMENT Bilateral 1996    WRIST FRACTURE SURGERY Right 2011        Medications Prior to Admission:     Prior to Admission medications    Medication Sig Start Date End Date Taking?  Authorizing Provider   escitalopram (LEXAPRO) 10 MG tablet Take 10 mg by mouth daily    Historical Provider, MD   Multiple Vitamins-Minerals (THERAPEUTIC MULTIVITAMIN-MINERALS) tablet Take 1 tablet by mouth daily 3/11/22   Yvonne Chavez MD   omeprazole (PRILOSEC) 20 MG delayed release capsule Take 40 mg by mouth Daily    Historical Provider, MD   ibuprofen (ADVIL;MOTRIN) 800 MG tablet Take 1 tablet by mouth every 8 hours as needed for Pain 5/21/17   Neto Dowell MD   sertraline (ZOLOFT) 25 MG tablet Take 1 tablet by mouth daily 3/13/17   Flynn Jimenez MD        Allergies:     Grass pollen([id number] ashutosh)    Social History:     Tobacco:    reports that he has been smoking e-cigarettes. He has been smoking about 0.50 packs per day. He has never used smokeless tobacco.  Alcohol:      reports current alcohol use of about 15.0 standard drinks of alcohol per week. Drug Use:  reports current drug use. Drug: Marijuana [place]). Family History:     Family History   Problem Relation Age of Onset    Depression Mother     Depression Sister     Depression Maternal Aunt     Heart Attack Neg Hx     Stroke Neg Hx     Cancer Neg Hx     Diabetes Neg Hx        Review of Systems:     Positive and Negative as described in HPI. CONSTITUTIONAL:  negative for fevers, chills, sweats, fatigue, weight loss  HEENT:  negative for vision, hearing changes, runny nose, throat pain  RESPIRATORY:  negative for shortness of breath, cough, congestion, wheezing. CARDIOVASCULAR:  negative for chest pain, palpitations.   GASTROINTESTINAL:  negative for nausea, vomiting, diarrhea, constipation, change in bowel habits, abdominal pain   GENITOURINARY:  negative for difficulty of urination, burning with urination, frequency   INTEGUMENT:  negative for rash, skin lesions, easy bruising   HEMATOLOGIC/LYMPHATIC:  negative for swelling/edema   ALLERGIC/IMMUNOLOGIC:  negative for urticaria , itching  ENDOCRINE:  negative increase in drinking, increase in urination, hot or cold intolerance  MUSCULOSKELETAL:  negative joint pains, muscle aches, swelling of joints  NEUROLOGICAL:  negative for headaches, dizziness, lightheadedness, numbness, pain, tingling extremities  BEHAVIOR/PSYC    Physical Exam:     /80   Pulse 68   Temp 97.5 °F (36.4 °C) (Oral)   Resp 16   Ht 6' (1.829 m)   Wt 228 lb (103.4 kg)   SpO2 98%   BMI 30.92 kg/m²   Temp (24hrs), Av.6 °F (36.4 °C), Min:97.5 °F (36.4 °C), Max:97.7 °F (36.5 °C)    No results for input(s): POCGLU in the last 72 hours. No intake or output data in the 24 hours ending 03/14/22 1426    General Appearance:  alert, well appearing, and in no acute distress  Mental status: oriented to person, place, and time with normal affect  Head:  normocephalic, atraumatic. Eye: no icterus, redness, pupils equal and reactive, extraocular eye movements intact, conjunctiva clear  Ear: normal external ear, no discharge, hearing intact  Nose:  no drainage noted  Mouth: mucous membranes moist  Neck: supple, no carotid bruits, thyroid not palpable  Lungs: Bilateral equal air entry, clear to ausculation, no wheezing, rales or rhonchi, normal effort  Cardiovascular: normal rate, regular rhythm, no murmur, gallop, rub. Abdomen: Soft, nontender, nondistended, normal bowel sounds, no hepatomegaly or splenomegaly  Neurologic: There are no new focal motor or sensory deficits, normal muscle tone and bulk, no abnormal sensation, normal speech, cranial nerves II through XII grossly intact  Skin: No gross lesions, rashes, bruising or bleeding on exposed skin area  Extremities:  peripheral pulses palpable, no pedal edema or calf pain with palpation  Psych: normal affect    Investigations:      Laboratory Testing:  No results found for this or any previous visit (from the past 24 hour(s)). Imaging/Diagonstics:  No results found.     Assessment :      Hospital Problems           Last Modified POA    Hx of substance abuse ([facility] [address] (Chronic) 3/12/2022 Yes    Abnormal liver function 3/12/2022 Yes    Alcohol dependence with withdrawal ([address] 3/13/2022 Yes    Class 1 obesity due to excess calories without serious comorbidity with body mass index (BMI) of 30.0 to 30.9 in adult 3/12/2022 Yes    Hypokalemia 3/12/2022 Yes    Hypomagnesemia 3/12/2022 Yes    Tachycardia 3/12/2022 Yes    MDD (major depressive disorder), recurrent severe, without psychosis ([address] 3/13/2022 Yes          Plan:     3 80-year-old gentleman with underlying history of alcoholism, admitted to inpatient psych for suicidal ideations,  2. Alcohol intoxication, monitor for withdrawal,  3. Hypokalemia, replace,  4. Tachycardia, possible alcohol-related withdrawal, started on Coreg,  5. Hypomagnesemia, recheck the levels and replace,  6. Obesity, low-calorie diet,  7. Full CODE STATUS    3/14  Labs reviewed   Tachycardia resolved  Will sign off        Consultations:   IP CONSULT TO INTERNAL MEDICINE  IP CONSULT TO SOCIAL WORK  IP CONSULT TO SOCIAL WORK      Cristo Plasencia MD  3/14/2022  2:26 PM    Copy sent to Dr. Amanda Kelley primary care provider on file. Please note that this chart was generated using voice recognition Dragon dictation software. Although every effort was made to ensure the accuracy of this automated transcription, some errors in transcription may have occurred.

## 2022-03-14 NOTE — FLOWSHEET NOTE
*Patient participated in Spirituality Group        03/14/22 7886   Encounter Summary   Services provided to: Patient   Referral/Consult From: Rounding   Continue Visiting   (3/14/22)   Complexity of Encounter Moderate   Length of Encounter 30 minutes   Spiritual/Yazdanism   Type Spiritual support   Assessment Calm; Approachable   Intervention Active listening   Outcome Receptive

## 2022-03-14 NOTE — GROUP NOTE
Group Therapy Note    Date: 3/14/2022    Group Start Time: 1000  Group End Time: 6507  Group Topic: Group Therapy    HARRY Weiss        Group Therapy Note  Pt declined to attend psychotherapy at 1000 am despite encouragement. Pt offered 1:1 and refused.     Attendees: 10/22               Signature:  HARRY Oliveros

## 2022-03-14 NOTE — GROUP NOTE
Group Therapy Note    Date: 3/14/2022    Group Start Time: 1430  Group End Time: 1121  Group Topic: Cognitive Skills    [facility]    ISABEL Ravi        Group Therapy Note    Attendees: 9/15         Patient's Goal:  To increase social interaction and to practice self expression, expressing feelings, and exploring positive coping, activities,people, places ,things that can assist pt in progress/moving forward, and communication skills. Notes: Pt was able to practice self expression, expressing feelings, and exploring positive coping, activities,people, places ,things that can assist pt in progress/moving forward, and communication skills. through creative expression and discussion. Pt shared individually and engaged in group discussion with peers and RT. Clement Fly Pt was pleasant and supportive of peers and able to relate some of their ideas and experiences. Status After Intervention:  Improved      Participation Level: Active Listener,sharing ,engaged in task/discussion fully     Participation Quality:  Attentive ,sharing ,supportive        Speech: Normal     Thought Process/Content: Logical,linear     Affective Functioning: Congruent     Mood: Euthymic     Level of consciousness:  Alert, and Attentive        Response to Learning:  Attentive,and Progressing to goal, able to identify own thoughts/feelings, able to acknowledge/verbalize new learning and insight.         Endings: None Reported     Modes of Intervention: Education, Support, Socialization, Exploration, Clarifying and Problem-solving        Discipline Responsible: Psychoeducational Specialist        Signature:  Joey Moreno

## 2022-03-15 VITALS
DIASTOLIC BLOOD PRESSURE: 81 MMHG | RESPIRATION RATE: 14 BRPM | HEIGHT: 72 IN | BODY MASS INDEX: 30.88 KG/M2 | TEMPERATURE: 98.1 F | HEART RATE: 90 BPM | WEIGHT: 228 LBS | SYSTOLIC BLOOD PRESSURE: 131 MMHG | OXYGEN SATURATION: 98 %

## 2022-03-15 PROCEDURE — 6370000000 HC RX 637 (ALT 250 FOR IP): Performed by: INTERNAL MEDICINE

## 2022-03-15 PROCEDURE — 6370000000 HC RX 637 (ALT 250 FOR IP): Performed by: PSYCHIATRY & NEUROLOGY

## 2022-03-15 PROCEDURE — 99239 HOSP IP/OBS DSCHRG MGMT >30: CPT | Performed by: PSYCHIATRY & NEUROLOGY

## 2022-03-15 RX ORDER — CLONIDINE HYDROCHLORIDE 0.1 MG/1
0.1 TABLET ORAL 2 TIMES DAILY PRN
Qty: 60 TABLET | Refills: 3 | Status: SHIPPED | OUTPATIENT
Start: 2022-03-15

## 2022-03-15 RX ORDER — THIAMINE MONONITRATE (VIT B1) 100 MG
100 TABLET ORAL DAILY
Qty: 30 TABLET | Refills: 0 | Status: SHIPPED | OUTPATIENT
Start: 2022-03-16

## 2022-03-15 RX ORDER — CARVEDILOL 3.12 MG/1
3.12 TABLET ORAL 2 TIMES DAILY WITH MEALS
Qty: 60 TABLET | Refills: 3 | Status: SHIPPED | OUTPATIENT
Start: 2022-03-15

## 2022-03-15 RX ADMIN — SERTRALINE HYDROCHLORIDE 50 MG: 50 TABLET ORAL at 08:09

## 2022-03-15 RX ADMIN — CARVEDILOL 3.12 MG: 3.12 TABLET, FILM COATED ORAL at 09:49

## 2022-03-15 RX ADMIN — NICOTINE POLACRILEX 4 MG: 2 GUM, CHEWING BUCCAL at 12:10

## 2022-03-15 RX ADMIN — MULTIPLE VITAMINS W/ MINERALS TAB 1 TABLET: TAB at 08:09

## 2022-03-15 RX ADMIN — NICOTINE POLACRILEX 4 MG: 2 GUM, CHEWING BUCCAL at 08:11

## 2022-03-15 RX ADMIN — THIAMINE HCL TAB 100 MG 100 MG: 100 TAB at 08:09

## 2022-03-15 RX ADMIN — PANTOPRAZOLE SODIUM 40 MG: 40 TABLET, DELAYED RELEASE ORAL at 08:09

## 2022-03-15 NOTE — BH NOTE
[facility]  Discharge Note    Pt discharged with followings belongings:   Dental Appliances: None  Vision - Corrective Lenses: None  Hearing Aid: None  Jewelry: Other (Comment) (nose ring)  Body Piercings Removed: No  Clothing: Footwear,Pants,Shirt,Socks,Undergarments (Comment),Other (Comment) (hat, blanket, shorts, brush)  Were All Patient Medications Collected?: Not Applicable  Other Valuables: Cell phone,Wallet,Other (Comment) (2 vapes, , 11.43$, [address], 's license, ins card)   Valuables sent home with patient or returned to patient. Patient education on aftercare instructions: given to patient  Information faxed to [facility] by nurse  at 3:01 PM .Patient verbalize understanding of AVS:  Yes, discharged home, via cab.     Status EXAM upon discharge:  Status and Exam  Normal: Yes  Facial Expression: Brightened  Affect: Appropriate  Level of Consciousness: Alert  Mood:Normal: Yes  Mood: Anxious  Motor Activity:Normal: Yes  Motor Activity: Decreased  Interview Behavior: Cooperative  Preception: [place] to Person,[place] to Time,[place] to Place,[place] to Situation  Attention:Normal: Yes  Attention: Distractible  Thought Processes: Circumstantial  Thought Content:Normal: Yes  Thought Content: Preoccupations  Hallucinations: None  Delusions: No  Memory:Normal: Yes  Insight and Judgment: Yes  Insight and Judgment: Poor Judgment,Poor Insight  Present Suicidal Ideation: No  Present Homicidal Ideation: No      Metabolic Screening:    No results found for: LABA1C    No results found for: CHOL  No results found for: TRIG  No results found for: HDL  No components found for: LDLCAL  No results found for: Grabiel Angulo LPN

## 2022-03-15 NOTE — BH NOTE
Patient given tobacco quitline number 15718550167 at this time, refusing to call at this time, states \" I just dont want to quit now\"- patient given information as to the dangers of long term tobacco use. Continue to reinforce the importance of tobacco cessation.

## 2022-03-15 NOTE — PLAN OF CARE
Problem: Altered Mood, Depressive Behavior:  Goal: Able to verbalize and/or display a decrease in depressive symptoms  Description: Able to verbalize and/or display a decrease in depressive symptoms  3/15/2022 0839 by Jonathan Alvarado  Outcome: Ongoing  Note: Patient is accepting of 1:1 talk time with staff. Patient is eating and sleeping well. Patient is medication compliant. Patient admits to depression and anxiety but states it is manageable. Q15 minute checks maintained. Patient remains safe at this time. Problem: Altered Mood, Depressive Behavior:  Goal: Ability to disclose and discuss suicidal ideas will improve  Description: Ability to disclose and discuss suicidal ideas will improve  3/15/2022 0839 by Jonathan Alvarado  Outcome: Ongoing  Note: Patient denies suicidal and homicidal ideation and auditory and visual hallucinations and verbally agrees to approach staff if thoughts of self harm arises. Q15 minute checks maintained. Patient remains safe at this time.

## 2022-03-15 NOTE — DISCHARGE SUMMARY
DISCHARGE SUMMARY      Patient ID:  Alyse Casillas  [id number]  59 y.o.  1995    Admit date: 3/12/2022    Discharge date and time: 3/15/2022    Disposition: Home     Admitting Physician: Donnell Li MD     Discharge Physician: Dr Alice Nuñez MD    Admission Diagnoses: Depression with suicidal ideation [F32. A, R45.851]    Admission Condition: poor    Discharged Condition: stable    Admission Circumstance: The patient is a 32 y.o. male with significant past history of depression and alcoholism. The patient has been seen by the psychiatry consultant. Admitted that he has been drinking alcohol leading up to his admission and has multiple losses including his job and car. He has also broken up with his girlfriend  The patient was seen at bedside. He told me that he is feeling overwhelmed. He has been depressed constantly. He also has suicidal ideation. The patient says he recognizes the impact of drinking on his mental health. He has been drinking about 1/5 of liquor every day. He can drink 15-20 shots a day. The patient states he experiences auditory hallucinations usually when he is withdrawing from alcohol but does not normally have any auditory or visual hallucination or psychotic symptoms. The patient recounts a number of stressful life events which have been contributing to his depressive symptoms as noted above. The patient reports depressed mood, anhedonia, hopelessness, helplessness, worthlessness, fatigue and lack of motivation. He has intermittent suicidal ideation and believes that he\" himself. He also has high blood anxiety which is generalized and usually always present. He denies any history of panic attack. The patient was pleasant on approach. He is alert and oriented. He is denying any psychotic symptoms. He denies any paranoia. The patient has some difficulty in sleeping and his appetite is poor.       PAST MEDICAL/PSYCHIATRIC HISTORY:   Past Medical History:   Diagnosis Date    Moderate episode of recurrent major depressive disorder ([facility] [address] 12/8/2016       FAMILY/SOCIAL HISTORY:  Family History   Problem Relation Age of Onset    Depression Mother     Depression Sister     Depression Maternal Aunt     Heart Attack Neg Hx     Stroke Neg Hx     Cancer Neg Hx     Diabetes Neg Hx      Social History     Socioeconomic History    Marital status: Single     Spouse name: none    Number of children: 0    Years of education: <12    Highest education level: Not on file   Occupational History    Occupation: [employer]     Employer: [employer]   Tobacco Use    Smoking status: Current Every Day Smoker     Packs/day: 0.50     Types: E-Cigarettes    Smokeless tobacco: Never Used   Substance and Sexual Activity    Alcohol use: Yes     Alcohol/week: 15.0 standard drinks     Types: 15 Shots of liquor per week     Comment: 15 shots of whiskey/vodka a day    Drug use: Yes     Types: Marijuana Deboraha Sanes)     Comment: daily    Sexual activity: Never   Other Topics Concern    Not on file   Social History Narrative    Diet - pretty gooe    Caffeine intake per day - 89- cups per day    Exercise pattern - none    Living situation - apt lori     Social Determinants of Health     Financial Resource Strain:     Difficulty of Paying Living Expenses: Not on file   Food Insecurity:     Worried About Running Out of Food in the Last Year: Not on file    Anita of Food in the Last Year: Not on file   Transportation Needs:     Lack of Transportation (Medical): Not on file    Lack of Transportation (Non-Medical):  Not on file   Physical Activity:     Days of Exercise per Week: Not on file    Minutes of Exercise per Session: Not on file   Stress:     Feeling of Stress : Not on file   Social Connections:     Frequency of Communication with Friends and Family: Not on file    Frequency of Social Gatherings with Friends and Family: Not on file    Attends Anabaptism Services: Not on file   [employer] Group of Clubs or Organizations: Not on file    Attends Club or Organization Meetings: Not on file    Marital Status: Not on file   Intimate Partner Violence:     Fear of Current or Ex-Partner: Not on file    Emotionally Abused: Not on file    Physically Abused: Not on file    Sexually Abused: Not on file   Housing Stability:     Unable to Pay for Housing in the Last Year: Not on file    Number of Places Lived in the Last Year: Not on file    Unstable Housing in the Last Year: Not on file       MEDICATIONS:    Current Facility-Administered Medications:     sertraline (ZOLOFT) tablet 50 mg, 50 mg, Oral, Daily, Robert Taylor MD, 50 mg at 03/15/22 0809    ondansetron (ZOFRAN) tablet 4 mg, 4 mg, Oral, Q8H PRN, Robert Taylor MD    LORazepam (ATIVAN) tablet 1 mg, 1 mg, Oral, Q1H PRN, 1 mg at 03/14/22 0004 **OR** LORazepam (ATIVAN) injection 1 mg, 1 mg, IntraMUSCular, Q1H PRN **OR** LORazepam (ATIVAN) tablet 2 mg, 2 mg, Oral, Q1H PRN **OR** LORazepam (ATIVAN) injection 2 mg, 2 mg, IntraMUSCular, Q1H PRN **OR** LORazepam (ATIVAN) tablet 3 mg, 3 mg, Oral, Q1H PRN **OR** LORazepam (ATIVAN) injection 3 mg, 3 mg, IntraMUSCular, Q1H PRN **OR** LORazepam (ATIVAN) tablet 4 mg, 4 mg, Oral, Q1H PRN **OR** LORazepam (ATIVAN) injection 4 mg, 4 mg, IntraMUSCular, Q1H PRN, Robert Taylor MD    thiamine mononitrate tablet 100 mg, 100 mg, Oral, Daily, Robert Taylor MD, 100 mg at 03/15/22 0809    acetaminophen (TYLENOL) tablet 650 mg, 650 mg, Oral, Q4H PRN, Harlow Dubin, MD    ibuprofen (ADVIL;MOTRIN) tablet 400 mg, 400 mg, Oral, Q6H PRN, Harlow Dubin, MD    aluminum & magnesium hydroxide-simethicone (MAALOX) 200-200-20 MG/5ML suspension 30 mL, 30 mL, Oral, Q6H PRN, Harlow Dubin, MD    traZODone (DESYREL) tablet 50 mg, 50 mg, Oral, Nightly PRN, Harlow Dubin, MD, 50 mg at 03/14/22 5710    polyethylene glycol (GLYCOLAX) packet 17 g, 17 g, Oral, Daily PRN, Harlow Dubin, MD    nicotine (NICODERM CQ) 14 MG/24HR 1 patch, 1 patch, TransDERmal, Daily, Frances Sharp MD    hydrOXYzine (ATARAX) tablet 50 mg, 50 mg, Oral, TID PRN, Frances Sharp MD, 50 mg at 03/14/22 2302    therapeutic multivitamin-minerals 1 tablet, 1 tablet, Oral, Daily, Judge Fide MD, 1 tablet at 03/15/22 0809    nicotine polacrilex (NICORETTE) gum 4 mg, 4 mg, Oral, Q1H PRN, Judge Fide MD, 4 mg at 03/15/22 0811    pantoprazole (PROTONIX) tablet 40 mg, 40 mg, Oral, QAM AC, Judge Fide MD, 40 mg at 03/15/22 0809    carvedilol (COREG) tablet 3.125 mg, 3.125 mg, Oral, BID WC, Judge Fide MD, 3.125 mg at 03/15/22 1358    cloNIDine (CATAPRES) tablet 0.1 mg, 0.1 mg, Oral, BID PRN, Judge Fide MD, 0.1 mg at 03/14/22 0004    Examination:  /81   Pulse 90   Temp 98.1 °F (36.7 °C) (Oral)   Resp 14   Ht 6' (1.829 m)   Wt 228 lb (103.4 kg)   SpO2 98%   BMI 30.92 kg/m²   Gait - steady    HOSPITAL COURSE[de-identified]  Following admission to the hospital, patient had a complete physical exam and blood work up. The patient was referred to Internal Medicine. Patient was monitored closely with suicide precaution  Patient was started on zoloft and a low dose of Librium to help with alcohol withdrawal symptoms. Was encouraged to participate in group and other milieu activity  Patient started to feel better with this combination of treatment. Significant progress in the symptoms since admission. Mood is improved  The patient denies AVH or paranoid thoughts  The patient denies any hopelessness or worthlessness  No active SI/HI  Appetite:  [x] Normal  [] Increased  [] Decreased    Sleep:       [x] Normal  [] Fair       [] Poor            Energy:    [x] Normal  [] Increased  [] Decreased     SI [] Present  [x] Absent  HI  []Present  [x] Absent   Aggression:  [] yes  [] no  Patient is [x] able  [] unable to CONTRACT FOR SAFETY   Medication side effects(SE):  [x] None(Psych.  Meds.) [] Other      Mental Status Examination on discharge:    Level of consciousness:  within normal limits   Appearance:  well-appearing  Behavior/Motor:  no abnormalities noted  Attitude toward examiner:  attentive and good eye contact  Speech:  spontaneous, normal rate and normal volume   Mood: euthymic  Affect:  mood congruent  Thought processes:  linear, goal directed and coherent   Thought content:  Suicidal Ideation:  denies suicidal ideation  Delusions:  no evidence of delusions  Perceptual Disturbance:  denies any perceptual disturbance  Cognition:  oriented to person, place, and time   Concentration intact  Memory intact  Insight good   Judgement fair   Fund of Knowledge adequate      ASSESSMENT:  Patient symptoms are:  [x] Well controlled  [x] Improving  [] Worsening  [] No change      Diagnosis:  Principal Problem:    MDD (major depressive disorder), recurrent severe, without psychosis ([facility] [address]  Active Problems:    [facility]    Abnormal liver function    Alcohol dependence with withdrawal (HCC)    Class 1 obesity due to excess calories without serious comorbidity with body mass index (BMI) of 30.0 to 30.9 in adult    Hypokalemia    Hypomagnesemia    Tachycardia  Resolved Problems:    * No resolved hospital problems. *      LABS:    No results for input(s): WBC, HGB, PLT in the last 72 hours. No results for input(s): NA, K, CL, CO2, BUN, CREATININE, GLUCOSE in the last 72 hours. No results for input(s): BILITOT, ALKPHOS, AST, ALT in the last 72 hours. No results found for: Spencer Mando, LABBENZ, CANNAB, COCAINESCRN, LABMETH, [address], PHENCYCLIDINESCREENURINE, PPXUR, ETOH  No results found for: TSH, FREET4  No results found for: LITHIUM  No results found for: VALPROATE, CBMZ    RISK ASSESSMENT AT DISCHARGE: Low risk for suicide and homicide. Treatment Plan:  Reviewed current Medications with the patient. Education provided on the complaince with treatment.     Risks, benefits, side effects, drug-to-drug interactions and alternatives to treatment were discussed. Encourage patient to attend outpatient follow up appointment and therapy. Patient was advised to call the outpatient provider, visit the nearest ED or call 911 if symptoms are not manageable.         Medication List      START taking these medications    carvedilol 3.125 MG tablet  Commonly known as: COREG  Take 1 tablet by mouth 2 times daily (with meals)     cloNIDine 0.1 MG tablet  Commonly known as: CATAPRES  Take 1 tablet by mouth 2 times daily as needed for High Blood Pressure (SBP> 160)     vitamin B-1 100 MG tablet  Commonly known as: THIAMINE  Take 1 tablet by mouth daily  Start taking on: March 16, 2022        CHANGE how you take these medications    sertraline 50 MG tablet  Commonly known as: ZOLOFT  Take 1 tablet by mouth daily  Start taking on: March 16, 2022  What changed:   · medication strength  · how much to take        CONTINUE taking these medications    ibuprofen 800 MG tablet  Commonly known as: ADVIL;MOTRIN  Take 1 tablet by mouth every 8 hours as needed for Pain     omeprazole 20 MG delayed release capsule  Commonly known as: PRILOSEC     therapeutic multivitamin-minerals tablet  Take 1 tablet by mouth daily        STOP taking these medications    Lexapro 10 MG tablet  Generic drug: escitalopram           Where to Get Your Medications      These medications were sent to [address]  [phone] [facility] [phone]  [address] [place] [address]    Phone: [phone]   · carvedilol 3.125 MG tablet  · cloNIDine 0.1 MG tablet  · sertraline 50 MG tablet  · vitamin B-1 100 MG tablet               Core Measures statement:   Not applicable      TIME SPENT - 35 MINUTES TO COMPLETE THE EVALUATION, DISCHARGE SUMMARY, MEDICATION RECONCILIATION AND FOLLOW UP CARE                                         Evelyn Christianson is a 32 y.o. male being evaluated Aristides Parry MD on 3/15/2022 at 10:43 AM    An electronic signature was used to authenticate this note. **This report has been created using voice recognition software. It may contain minor errors which are inherent in voice recognition technology. **

## 2022-03-15 NOTE — GROUP NOTE
Group Therapy Note    Date: 3/15/2022    Group Start Time: 0900  Group End Time: 0915  Group Topic: Group Therapy    [facility]    Rachel Giles        Group Therapy Note    Attendees: 7/19         Patient's Goal:  To work on discharge and getting individual therapy at [address] [place] [address]. Status After Intervention:  Improved    Participation Level:  Active Listener and Interactive    Participation Quality: Appropriate and Attentive      Speech:  normal      Thought Process/Content: Linear      Affective Functioning: Congruent      Mood: euthymic      Level of consciousness:  Alert and Oriented x4      Response to Learning: Able to verbalize current knowledge/experience and Able to verbalize/acknowledge new learning      Endings: None Reported    Modes of Intervention: Education and Support      Discipline Responsible: [address], [email or web address]      Signature:  Kevin Braxton

## 2022-03-15 NOTE — GROUP NOTE
Group Therapy Note    Date: 3/14/2022    Group Start Time: 2030  Group End Time: 2100  Group Topic: Wrap-Up    CHRISTINA Solis; Nam Thomas        Group Therapy Note    Attendees: 7/17    Patients engaged in evening wrap-up group by participating in gratitude reflection by using GLAD journal method (Gratitude, Learning, Accomplishment, Delight) and goal-setting for tomorrow. Patient's Goal:  Maintain sobriety after discharge    Notes:  Patient pleasant and appropriate in group. Reports being proud of his sobriety and his optimism toward maintaining sobriety. Status After Intervention:  Improved    Participation Level:  Active Listener and Interactive    Participation Quality: Appropriate, Attentive, Sharing and Supportive      Speech:  normal      Thought Process/Content: Logical      Affective Functioning: Congruent      Mood: euthymic      Level of consciousness:  Alert and Oriented x4      Response to Learning: Able to verbalize current knowledge/experience, Able to verbalize/acknowledge new learning, Able to retain information, Capable of insight and Progressing to goal      Endings: None Reported    Modes of Intervention: Education, Support, Socialization and Exploration      Discipline Responsible: Behavorial Health Tech      Signature:  Deidre Solis

## 2022-03-15 NOTE — GROUP NOTE
Group Therapy Note    Date: 3/15/2022    Group Start Time: 1000  Group End Time: 3601  Group Topic: Psychoeducation    CHRISTINA HERNANDEZ    BORIS Martinez LSW        Group Therapy Note    Attendees: 9         Patient's Goal:  Pt will demonstrate increased interpersonal interaction. Notes:  Group topic was Cognitive Distortions. Pt participated well in group.      Status After Intervention:  Improved    Participation Level: Interactive    Participation Quality: Appropriate, Attentive and Sharing      Speech:  normal      Thought Process/Content: Logical      Affective Functioning: Congruent      Mood: elevated      Level of consciousness:  Alert      Response to Learning: Progressing to goal      Endings: None Reported    Modes of Intervention: Education and Support      Discipline Responsible: /Counselor      Signature:  BORIS Martinez LSW

## 2022-03-15 NOTE — PLAN OF CARE
Problem: Altered Mood, Depressive Behavior:  Goal: Ability to disclose and discuss suicidal ideas will improve  Description: Ability to disclose and discuss suicidal ideas will improve  3/14/2022 2031 by Phu Jefferson LPN  Outcome: Ongoing        Problem: Substance Abuse:  Goal: Absence of drug withdrawal signs and symptoms  Description: Absence of drug withdrawal signs and symptoms  3/14/2022 2031 by Phu Jefferson LPN  Outcome: Ongoing   Denies suicidal thoughts and remains free from harm at this time. Denies s/s of withdrawal. Out to day room and social with peers.

## 2022-03-15 NOTE — GROUP NOTE
Group Therapy Note    Date: 3/15/2022    Group Start Time: 1100  Group End Time: 3059  Group Topic: Cognitive Skills    [facility]    ISABEL Landeros        Group Therapy Note    Attendees: 9/19         Patient's Goal:  To increase social interaction and to practice problem solving, and communication skills. Notes: Pt participated fully in group task . Pt was able to practice problem solving, and communication skills independently . Pt was pleasant and supportive of peers. Status After Intervention:  Improved     Participation Level:  Active Listener and Interactive     Participation Quality: Appropriate, Attentive, Sharing and Supportive        Speech:  normal        Thought Process/Content: Logical,Linear        Affective Functioning: Congruent        Mood: euthymic, enthusiastic r/t task        Level of consciousness:  Alert, Oriented x4 and Attentive        Response to Learning: Able to verbalize current knowledge/experience, Able to verbalize/acknowledge new learning, Able to retain information and Progressing to goal        Endings: None Reported     Modes of Intervention: Education, Support, Socialization, Exploration, Clarifying and Problem-solving        Discipline Responsible: Psychoeducational Specialist        Signature:  ISABEL Harris

## 2024-04-01 NOTE — GROUP NOTE
Group Therapy Note    Date: 3/14/2022    Group Start Time: 1100  Group End Time: 4823  Group Topic: Cognitive Skills    [facility]    ISABEL Anna        Group Therapy Note    Attendees: 14/20         Patient's Goal:  To increase social interaction and to practice deductive reasoning, and communication skills. Notes: Pt participated fully in group task . Pt was able to practice deductive reasoning, and communication skills independently . Pt was pleasant and supportive of peers. Status After Intervention:  Improved     Participation Level:  Active Listener and Interactive     Participation Quality: Appropriate, Attentive, Sharing and Supportive        Speech:  normal        Thought Process/Content: Logical,Linear        Affective Functioning: Congruent        Mood: euthymic, enthusiastic r/t task        Level of consciousness:  Alert, Oriented x4 and Attentive        Response to Learning: Able to verbalize current knowledge/experience, Able to verbalize/acknowledge new learning, Able to retain information and Progressing to goal        Endings: None Reported     Modes of Intervention: Education, Support, Socialization, Exploration, Clarifying and Problem-solving        Discipline Responsible: Psychoeducational Specialist        Signature:  ISABEL Chopra October